# Patient Record
Sex: MALE | Race: WHITE | NOT HISPANIC OR LATINO | Employment: OTHER | URBAN - METROPOLITAN AREA
[De-identification: names, ages, dates, MRNs, and addresses within clinical notes are randomized per-mention and may not be internally consistent; named-entity substitution may affect disease eponyms.]

---

## 2017-01-06 ENCOUNTER — TELEPHONE (OUTPATIENT)
Dept: ORTHOPEDICS | Facility: CLINIC | Age: 57
End: 2017-01-06

## 2017-01-06 DIAGNOSIS — M19.032: Primary | ICD-10-CM

## 2017-01-06 DIAGNOSIS — M19.031: Primary | ICD-10-CM

## 2017-01-06 DIAGNOSIS — M79.645 PAIN OF LEFT THUMB: ICD-10-CM

## 2017-01-06 NOTE — TELEPHONE ENCOUNTER
Referral placed. Called patient to inform him that they will call to schedule.     Trish Elizabeth M.Ed., ATC

## 2017-01-06 NOTE — TELEPHONE ENCOUNTER
Reason for Call: Request for an order or referral:    Order or referral being requested: referral    Date needed: as soon as possible    Has the patient been seen by the PCP for this problem? Not Applicable    Additional comments: pt calling statin he would like to get a referral to see the hand specialist that Dr. Angel recommended     Phone number Patient can be reached at:  Home number on file [phone] (home)    Best Time:  Any     Can we leave a detailed message on this number?  YES    Call taken on 1/6/2017 at 12:28 PM by Caprice Connolly

## 2017-01-19 ENCOUNTER — MEDICAL CORRESPONDENCE (OUTPATIENT)
Dept: HEALTH INFORMATION MANAGEMENT | Facility: CLINIC | Age: 57
End: 2017-01-19

## 2017-01-25 ENCOUNTER — HOSPITAL ENCOUNTER (OUTPATIENT)
Dept: PHYSICAL THERAPY | Facility: CLINIC | Age: 57
Setting detail: THERAPIES SERIES
End: 2017-01-25
Attending: PHYSICIAN ASSISTANT
Payer: COMMERCIAL

## 2017-01-25 PROCEDURE — 97535 SELF CARE MNGMENT TRAINING: CPT | Mod: GP | Performed by: PHYSICAL THERAPIST

## 2017-01-25 PROCEDURE — 97112 NEUROMUSCULAR REEDUCATION: CPT | Mod: GP | Performed by: PHYSICAL THERAPIST

## 2017-01-25 PROCEDURE — 97110 THERAPEUTIC EXERCISES: CPT | Mod: GP | Performed by: PHYSICAL THERAPIST

## 2017-01-25 PROCEDURE — 40000718 ZZHC STATISTIC PT DEPARTMENT ORTHO VISIT: Performed by: PHYSICAL THERAPIST

## 2017-01-25 PROCEDURE — 97162 PT EVAL MOD COMPLEX 30 MIN: CPT | Mod: GP | Performed by: PHYSICAL THERAPIST

## 2017-01-25 PROCEDURE — 97140 MANUAL THERAPY 1/> REGIONS: CPT | Mod: GP | Performed by: PHYSICAL THERAPIST

## 2017-01-25 NOTE — PROGRESS NOTES
Koko Serrano     PHYSICAL THERAPY EVALUATION    01/25/17 1500   General Information   Type of Visit Initial OP Ortho PT Evaluation   Start of Care Date 01/25/17   Referring Physician JEANETTE Martinez   Patient/Family Goals Statement not sure, learn some exercises, prrevent LB from going out   Orders Evaluate and Treat   Date of Order 01/23/17   Insurance Type Blue [employer]   Insurance Comments/Visits Authorized blue plus MA   Medical Diagnosis back pain   Surgical/Medical history reviewed Yes   Body Part(s)   Body Part(s) Lumbar Spine/SI;Cervical Spine   Presentation and Etiology   Pertinent history of current problem (include personal factors and/or comorbidities that impact the POC) LBP, worked construction whole life.  Had other injuries along the way.  Dozer rolled on him, broke his neck.  About 3 years ago would try to step over things and he'd fall over.  Now in past year, cannot bend over to  something, back will go out.  Has happened 3 times this year.  L hip will go out, about 15 times this year.  Goes to chiro when back goes out.  Goes to [employer] to work out.  Does elliptical, , back exten, ab curls, push ex  . LB has gone out twice in past 2 months, cannot get up, needs help.  Will get pain down R LE when back goes out.  Also notes daily neck pain, bothers him more than LB on a daily basis, LB is worse when it goes out.  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   Onset date of current episode/exacerbation 11/25/16  (couple months)   Pain/symptoms exacerbated by A. Sitting;B. Walking;I. Bending   Prior Level of Function   Functional Level Prior Comment indep living, farms animals, goes to gym   Current Level of Function   Patient role/employment history A. Employed   Fall Risk Screen   Fall screen completed by PT   Per patient - Fall 2 or more times in past year? Yes   Per patient - Fall with injury in past year? No   Is patient a fall risk? No    Fall screen comments when hip or LB goes out   Lumbar Spine/SI Objective Findings   Posture decreased lumbar lordosis, mid T kyphosis, fwd head   Flexion ROM 75% pulls LB   Extension ROM 50% central LBP   Right Side Bending ROM 75%   Left Side Bending ROM 75%   Lumbar ROM Comment supine flex 100%, feels good   Pelvic Screen R ASIS inf/ L sup supine   Hip Screen hip ROM WNL except L IR 15*, FADIR +L   Hip Abduction Strength L 4, R 5   Hip Extension Strength L 5-, R 5   Hamstring Flexibility 60*BV   Hip Flexor Flexibility 15*B   Quadricep Flexibility WNL   SLR neg   Crossover SLR neg   Palpation tender B SI   Cervical Spine   Posture B winging scapula   Cervical Flexion ROM 80% pulls, EXT 80%, SB L 15%, R 25%, ROT R 40%, ROT L 50%   Shoulder ER (C5, C6) Strength 5-/5 B   Cervical Distraction Test felt good   Neck Flexor Endurance Test (normal 39 sec) 20   Segmental Mobility-Cervical OA ROT limited B, 20% R, 25% L, hypomob OA EXT/SB R   Palpation tight R suboccip   Planned Therapy Interventions   Planned Therapy Interventions stretching;strengthening;manual therapy;joint mobilization;neuromuscular re-education   Clinical Impression   Criteria for Skilled Therapeutic Interventions Met yes, treatment indicated   PT Diagnosis LBP, cervical pain, mm tightness   Functional limitations due to impairments bending, lifting heavy, standing too long   Clinical Presentation Evolving/Changing   Clinical Presentation Rationale 1-2 personal factors and/comorbidities that impact the POC., LBP, cervical, hip issues, prior injuries, broken neck, fractured leg, L knee needs replacement   Clinical Decision Making (Complexity) Moderate complexity   Therapy Frequency 1 time/week   Predicted Duration of Therapy Intervention (days/wks) 6wks   Risk & Benefits of therapy have been explained Yes   Patient, Family & other staff in agreement with plan of care Yes   Education Assessment   Preferred Learning Style Pictures/video;Demonstration    Barriers to Learning No barriers   ORTHO GOALS   PT Ortho Eval Goals 1;2;3   Ortho Goal 1   Goal Description pt will be able to demonstrate proper body mechanics while lifting 20# floor to waist for prevention of further LB issue   Target Date 03/08/17   Ortho Goal 2   Goal Description pt will be able to shovel snow as needed without LB going out in 6wk   Target Date 03/08/17   Ortho Goal 3   Goal Description pt will be indep inhome strengthening program for sefl management of sx in 6wk   Target Date 03/08/17   Total Evaluation Time   Total Evaluation Time 30   Kris Hoenk, PT #[id number]  [facility]

## 2017-01-31 ENCOUNTER — HOSPITAL ENCOUNTER (OUTPATIENT)
Dept: PHYSICAL THERAPY | Facility: CLINIC | Age: 57
Setting detail: THERAPIES SERIES
End: 2017-01-31
Attending: PHYSICIAN ASSISTANT
Payer: COMMERCIAL

## 2017-01-31 ENCOUNTER — HOSPITAL ENCOUNTER (OUTPATIENT)
Dept: MRI IMAGING | Facility: CLINIC | Age: 57
Discharge: HOME | End: 2017-01-31
Attending: PHYSICIAN ASSISTANT | Admitting: PHYSICIAN ASSISTANT
Payer: COMMERCIAL

## 2017-01-31 ENCOUNTER — HOSPITAL ENCOUNTER (OUTPATIENT)
Dept: MRI IMAGING | Facility: CLINIC | Age: 57
End: 2017-01-31
Attending: PHYSICIAN ASSISTANT
Payer: COMMERCIAL

## 2017-01-31 ENCOUNTER — HOSPITAL ENCOUNTER (OUTPATIENT)
Dept: GENERAL RADIOLOGY | Facility: CLINIC | Age: 57
End: 2017-01-31
Attending: PHYSICIAN ASSISTANT
Payer: COMMERCIAL

## 2017-01-31 DIAGNOSIS — M43.10 SPONDYLOLISTHESIS: ICD-10-CM

## 2017-01-31 DIAGNOSIS — M54.10 RADICULOPATHY, UNSPECIFIED SPINAL REGION: ICD-10-CM

## 2017-01-31 PROCEDURE — 40000718 ZZHC STATISTIC PT DEPARTMENT ORTHO VISIT: Performed by: PHYSICAL THERAPIST

## 2017-01-31 PROCEDURE — 72141 MRI NECK SPINE W/O DYE: CPT

## 2017-01-31 PROCEDURE — 70030 X-RAY EYE FOR FOREIGN BODY: CPT

## 2017-01-31 PROCEDURE — 97140 MANUAL THERAPY 1/> REGIONS: CPT | Mod: GP | Performed by: PHYSICAL THERAPIST

## 2017-01-31 PROCEDURE — 97110 THERAPEUTIC EXERCISES: CPT | Mod: GP | Performed by: PHYSICAL THERAPIST

## 2017-01-31 PROCEDURE — 72148 MRI LUMBAR SPINE W/O DYE: CPT

## 2017-03-17 NOTE — PROGRESS NOTES
PHYSICAL THERAPY DISCHARGE  01/31/17 1400   Signing Clinician's Name / Credentials   Signing clinician's name / credentials Kris Hoenk, PT   Session Number   Session Number 2 blue plus MA   Ortho Goal 1   Goal Description pt will be able to demonstrate proper body mechanics while lifting 20# floor to waist for prevention of further LB issue   Target Date 03/08/17   Ortho Goal 2   Goal Description pt will be able to shovel snow as needed without LB going out in 6wk   Target Date 03/08/17   Ortho Goal 3   Goal Description pt will be indep inhome strengthening program for sefl management of sx in 6wk   Target Date 03/08/17   Subjective Report   Subjective Report LB has been better, has not worked, not to gym, neck better too   Therapeutic Procedure/exercise   Minutes 18   Skilled Intervention stretches for hip, core stab   Patient Response ceus needed for TA   Treatment Detail hip flexor stretch in kneeling, upper trap, levataor stretches, TA set review, needed cues again to draw in/not out, TA with march x10   Manual Therapy   Minutes 25   Skilled Intervention MET, manual techniques   Patient Response tender R suboccip, less tight   Treatment Detail MET pube clearing, Lpost ilium, STM suboccip, uper cerv R>L, suboccip release/trigger points   Plan   Plan for next session 1x/wk up to 6wk   Comments   Comments 1-2more likely  Patient has not been seen in over 30 days and will be discharged at this time.  Final status as above     Total Session Time   Total Session Time 43   Kris Hoenk, PT #[id number]  [facility]

## 2017-05-17 ENCOUNTER — OFFICE VISIT (OUTPATIENT)
Dept: OTOLARYNGOLOGY | Facility: CLINIC | Age: 57
End: 2017-05-17
Payer: COMMERCIAL

## 2017-05-17 VITALS — HEIGHT: 70 IN | BODY MASS INDEX: 25.4 KG/M2 | RESPIRATION RATE: 16 BRPM | WEIGHT: 177.4 LBS

## 2017-05-17 DIAGNOSIS — J01.00 ACUTE NON-RECURRENT MAXILLARY SINUSITIS: Primary | ICD-10-CM

## 2017-05-17 PROCEDURE — 99203 OFFICE O/P NEW LOW 30 MIN: CPT | Performed by: OTOLARYNGOLOGY

## 2017-05-17 ASSESSMENT — PAIN SCALES - GENERAL: PAINLEVEL: NO PAIN (0)

## 2017-05-17 NOTE — NURSING NOTE
"Chief Complaint   Patient presents with     Lesion     inside nose, bleeding       Initial Resp 16  Ht 1.778 m (5' 10\")  Wt 80.5 kg (177 lb 6.4 oz)  BMI 25.45 kg/m2 Estimated body mass index is 25.45 kg/(m^2) as calculated from the following:    Height as of this encounter: 1.778 m (5' 10\").    Weight as of this encounter: 80.5 kg (177 lb 6.4 oz).  Medication Reconciliation: complete     Katlyn Bran MA    "

## 2017-05-17 NOTE — PROGRESS NOTES
Chief Complaint - sinusitis    History of Present Illness - Koko Serrano is a 57 year old male who presents for evaluation of possible sinusitis. The patient describes symptoms of fever, right face pressure, sore throat. He feels in his nose he has white spots and bleeding from right nose. This started two weeks ago after returning from [place]. He swam in ocean. Treatments have included sudafed, nyquil, ibuprofen. He is getting better. No prior history of sinusitis or sinus surgery.    Past Medical History -   Patient Active Problem List   Diagnosis     Hematuria     HYPERLIPIDEMIA LDL GOAL <130       Current Medications -   Current Outpatient Prescriptions:      Ascorbic Acid (VITAMIN C) 500 MG CHEW, Take 1 chew tab by mouth daily as needed, Disp: , Rfl:      methylPREDNISolone (MEDROL DOSEPAK) 4 MG tablet, Follow package instructions, Disp: 21 tablet, Rfl: 0     Cholecalciferol (VITAMIN D) 2000 UNITS CAPS, Take 1 tablet by mouth daily., Disp: , Rfl:      FISH OIL 1000 MG PO CAPS, 1 CAPSULES DAILY , Disp: , Rfl:      GLUCOSAMINE CHONDRO COMPLEX OR, 1 tablet by mouth daily, Disp: , Rfl:     Allergies -   Allergies   Allergen Reactions     Nka [No Known Allergies]        Social History -   Social History     Social History     Marital status:      Spouse name: N/A     Number of children: N/A     Years of education: N/A     Social History Main Topics     Smoking status: Never Smoker     Smokeless tobacco: Never Used     Alcohol use Yes      Comment: rare     Drug use: None     Sexual activity: Not Asked     Other Topics Concern     None     Social History Narrative       Family History -   Family History   Problem Relation Age of Onset     Hypertension Mother      Circulatory Maternal Grandmother      aneurysm-AAA     Circulatory Paternal Grandmother      Blood clot     Hypertension Brother      Alcohol/Drug Brother      Alcohol/Drug Maternal Grandfather        Review of Systems - As per HPI and PMHx,  "otherwise 7 system review of the head and neck negative.    Physical Exam  Resp 16  Ht 1.778 m (5' 10\")  Wt 80.5 kg (177 lb 6.4 oz)  BMI 25.45 kg/m2  General - The patient is nontoxic, in no distress. Alert and oriented to person and place, answers questions and cooperates with examination appropriately.   Neurologic - CN II-XII are intact. No focal neurologic deficits.   Voice and Breathing - The patient was breathing comfortably without the use of accessory muscles. There was no wheezing, stridor, or stertor.  The patients voice was clear and strong.  Eyes - Extraocular movements intact.  Sclera were not icteric or injected, conjunctiva were pink and moist.  Mouth - Examination of the oral cavity showed pink, healthy oral mucosa. No lesions or ulcerations noted.  The tongue was mobile and midline.  Throat - The walls of the oropharynx were smooth, symmetric, and had no lesions or ulcerations.  No postnasal drainage.  The uvula was midline on elevation.  Nose - External contour is symmetric, no gross deflection or scars.  He has some right sided white mucous anteriorly on the septum. Also some bloody and raw spots. Some yellow crust on the head of the right middle turbinate. No polyps.  The septum was midline and non-obstructive.  Neck - Palpation of the occipital, submental, submandibular, internal jugular chain, and supraclavicular nodes did not demonstrate any abnormal lymph nodes or masses. No parotid masses. Palpation of the thyroid was soft and smooth, with no nodules or goiter appreciated.  The trachea was mobile and midline.        A/P - Koko Serrano is a 57 year old male with likely resolving sinusitis. He is much better. He has some mucous still on the right side. I recommend treatment with nasal saline irrigations. If in 1-2 weeks things aren't fully resolved he should call for an antibiotic.     Low Buckley MD  Otolaryngology  [facility] [place]      "

## 2017-05-17 NOTE — PATIENT INSTRUCTIONS
General Scheduling Information  To schedule your CT/MRI scan, please contact [place] [facility] at [phone]   [address] [place] [address]    To schedule your Surgery, please contact our Specialty Schedulers at [phone]    ENT Clinic Locations Clinic Hours Telephone Number     [facility]  [address] [place] [address]   Tuesday:       8:00am -- 4:00pm    Wednesday:  8:00am - 4:00pm   To schedule an appointment with   Dr. Buckley,   please contact our   Specialty Scheduling Department at:     [phone]       [facility]  [address] [place] [address]   Friday:          8:00am - 4:00pm         Urgent Care Locations Clinic Hours Telephone Numbers     [facility]  [address] [place] [address]     Monday-Friday:     11:00pm - 9:00pm    Saturday-Sunday:  9:00am - 5:00pm   [phone]     [facility]  [address] [place] [address]     Monday-Friday:      5:00pm - 9:00pm     Saturday-Sunday:  9:00am - 5:00pm   [phone]

## 2017-05-17 NOTE — MR AVS SNAPSHOT
After Visit Summary   5/17/2017    Koko Serrano    MRN: [id number]           Patient Information     Date Of Birth          1960        Visit Information        Provider Department      5/17/2017 10:00 AM Low Buckley MD [facility]        Today's Diagnoses     Acute non-recurrent maxillary sinusitis    -  1      Care Instructions    General Scheduling Information  To schedule your CT/MRI scan, please contact [place] [facility] at [phone]   [address] [place] [address]    To schedule your Surgery, please contact our Specialty Schedulers at [phone]    ENT Clinic Locations Clinic Hours Telephone Number     [facility]  [address]  [place] [address]   Tuesday:       8:00am -- 4:00pm    Wednesday:  8:00am - 4:00pm   To schedule an appointment with   Dr. Buckley,   please contact our   Specialty Scheduling Department at:     [phone]       [facility]  [address] [place] [address]   Friday:          8:00am - 4:00pm         Urgent Care Locations Clinic Hours Telephone Numbers     [facility]  [address] [place] [address]     Monday-Friday:     11:00pm - 9:00pm    Saturday-Sunday:  9:00am - 5:00pm   [phone]     [facility]  [address] [address]     Monday-Friday:      5:00pm - 9:00pm     Saturday-Sunday:  9:00am - 5:00pm   [phone]             Follow-ups after your visit        Who to contact     If you have questions or need follow up information about today's clinic visit or your schedule please contact [facility] directly at [phone].  Normal or non-critical lab and imaging results will be communicated to you by MyChart, letter or phone within 4 business days after the clinic has received the results. If you do not hear from us within 7 days, please contact the clinic through MyChart or phone. If you have a critical or abnormal lab result, we will  "notify you by phone as soon as possible.  Submit refill requests through [email or web address] or call your pharmacy and they will forward the refill request to us. Please allow 3 business days for your refill to be completed.          Additional Information About Your Visit        [employer] Information     [email or web address] lets you send messages to your doctor, view your test results, renew your prescriptions, schedule appointments and more. To sign up, go to [email or web address] . Click on \"Log in\" on the left side of the screen, which will take you to the Welcome page. Then click on \"Sign up Now\" on the right side of the page.     You will be asked to enter the access code listed below, as well as some personal information. Please follow the directions to create your username and password.     Your access code is: [email or web address]  Expires: 8/15/2017 12:55 PM     Your access code will  in 90 days. If you need help or a new code, please call your [facility] clinic or [phone].        Care EveryWhere ID     This is your Care EveryWhere ID. This could be used by other organizations to access your [facility] medical records  [id number]        Your Vitals Were     Respirations Height BMI (Body Mass Index)             16 1.778 m (5' 10\") 25.45 kg/m2          Blood Pressure from Last 3 Encounters:   16 122/79   10/11/16 120/75   10/04/16 116/74    Weight from Last 3 Encounters:   17 80.5 kg (177 lb 6.4 oz)   10/11/16 81.6 kg (180 lb)   10/04/16 81.6 kg (180 lb)              Today, you had the following     No orders found for display       Primary Care Provider    Physician No Ref-Primary       No address on file        Thank you!     Thank you for choosing [facility] [place]  for your care. Our goal is always to provide you with excellent care. Hearing back from our patients is one way we can continue to improve our services. Please take a few minutes to complete the written survey that you may receive in the mail " after your visit with us. Thank you!             Your Updated Medication List - Protect others around you: Learn how to safely use, store and throw away your medicines at www.disposemymeds.org.          This list is accurate as of: 5/17/17 12:55 PM.  Always use your most recent med list.                   Brand Name Dispense Instructions for use    fish oil-omega-3 fatty acids 1000 MG capsule      1 CAPSULES DAILY       GLUCOSAMINE CHONDRO COMPLEX OR      1 tablet by mouth daily       methylPREDNISolone 4 MG tablet    MEDROL DOSEPAK    21 tablet    Follow package instructions       vitamin C 500 MG Chew      Take 1 chew tab by mouth daily as needed       vitamin D 2000 UNITS Caps      Take 1 tablet by mouth daily.

## 2018-05-07 ENCOUNTER — OFFICE VISIT (OUTPATIENT)
Dept: FAMILY MEDICINE | Facility: CLINIC | Age: 58
End: 2018-05-07
Payer: COMMERCIAL

## 2018-05-07 ENCOUNTER — RADIANT APPOINTMENT (OUTPATIENT)
Dept: GENERAL RADIOLOGY | Facility: CLINIC | Age: 58
End: 2018-05-07
Attending: NURSE PRACTITIONER
Payer: COMMERCIAL

## 2018-05-07 VITALS
OXYGEN SATURATION: 94 % | WEIGHT: 177.4 LBS | DIASTOLIC BLOOD PRESSURE: 76 MMHG | SYSTOLIC BLOOD PRESSURE: 116 MMHG | BODY MASS INDEX: 25.45 KG/M2 | HEART RATE: 74 BPM | TEMPERATURE: 96.7 F | RESPIRATION RATE: 20 BRPM

## 2018-05-07 DIAGNOSIS — J20.9 ACUTE BRONCHITIS, UNSPECIFIED ORGANISM: Primary | ICD-10-CM

## 2018-05-07 DIAGNOSIS — R05.9 COUGH: ICD-10-CM

## 2018-05-07 PROCEDURE — 71046 X-RAY EXAM CHEST 2 VIEWS: CPT | Mod: FY

## 2018-05-07 PROCEDURE — 99214 OFFICE O/P EST MOD 30 MIN: CPT | Performed by: NURSE PRACTITIONER

## 2018-05-07 RX ORDER — PREDNISONE 20 MG/1
TABLET ORAL
Qty: 10 TABLET | Refills: 0 | Status: SHIPPED | OUTPATIENT
Start: 2018-05-07 | End: 2020-05-27

## 2018-05-07 RX ORDER — MAGNESIUM 200 MG
1000 TABLET ORAL DAILY
COMMUNITY

## 2018-05-07 RX ORDER — ALBUTEROL SULFATE 90 UG/1
2 AEROSOL, METERED RESPIRATORY (INHALATION) EVERY 6 HOURS PRN
Qty: 1 INHALER | Refills: 0 | Status: SHIPPED | OUTPATIENT
Start: 2018-05-07 | End: 2020-05-27

## 2018-05-07 RX ORDER — BENZONATATE 100 MG/1
100 CAPSULE ORAL 3 TIMES DAILY PRN
Qty: 42 CAPSULE | Refills: 0 | Status: SHIPPED | OUTPATIENT
Start: 2018-05-07 | End: 2020-05-27

## 2018-05-07 NOTE — NURSING NOTE
"Chief Complaint   Patient presents with     Cough       Initial /76 (BP Location: Right arm, Cuff Size: Adult Regular)  Pulse 74  Temp 96.7  F (35.9  C) (Tympanic)  Resp 20  Wt 177 lb 6.4 oz (80.5 kg)  SpO2 94%  BMI 25.45 kg/m2 Estimated body mass index is 25.45 kg/(m^2) as calculated from the following:    Height as of 5/17/17: 5' 10\" (1.778 m).    Weight as of this encounter: 177 lb 6.4 oz (80.5 kg).      Health Maintenance that is potentially due pending provider review:  NONE    n/a    Is there anyone who you would like to be able to receive your results? Not Applicable  If yes have patient fill out JOANNE  Veto [place] M.A.      "

## 2018-05-07 NOTE — PATIENT INSTRUCTIONS
Use Medication as directed      Hydrate with fluids, rest, cool humidifier.  May use acetaminophen, ibuprofen prn.    For your Cough   The Buckwheat Honey Dose: Given   hour Prior to Bedtime  For children age under 1 year -Do not use due to botulism risk     2-5 years -  tsp (2.5 mL)    6-11 years -1 tsp (5 mL)    12-18 years -2 tsp (10 mL)     Guaifenesin     Adult dose -Not to exceed 2.4 g (2400mg) per day    Child age 6-12 years -100 mg every 4 hr, not to exceed 1.2 g (1200mg) per day     Pediatric, 2-6 years -50 mg every 4 hr as needed, not to exceed 600 mg per day    Cough medications is not recommended in children under 2 years.  With use of cough medications have combination medications be aware of products in the cough medication you are using to avoid overdose    Follow up with PCP in 1 weeks.    Go to Emergency Room if sx worsen or change, Shortness of breath, chest pain, persistent fevers, or painful breathing occur.     Patient voiced understanding of instructions given.          Viral Bronchitis (Adult)    You have a viral bronchitis. Bronchitis is inflammation and swelling of the lining of the lungs. This is often caused by an infection. Symptoms include a dry, hacking cough that is worse at night. The cough may bring up yellow-green mucus. You may also feel short of breath or wheeze. Other symptoms may include tiredness, chest discomfort, and chills.  Bronchitis that is caused by a virus is not treated with antibiotics. Instead, medicines may be given to help relieve symptoms. Symptoms can last up to 2 weeks, although the cough may last much longer.  This illness is contagious during the first few days and is spread through the air by coughing and sneezing, or by direct contact (touching the sick person and then touching your own eyes, nose, or mouth).  Most viral illnesses resolve within 10 to 14 days with rest and simple home remedies, although they may sometimes last for several weeks.  Home  care    If symptoms are severe, rest at home for the first 2 to 3 days. When you go back to your usual activities, don't let yourself get too tired.    Do not smoke. Also avoid being exposed to secondhand smoke.    You may use over-the-counter medicine to control fever or pain, unless another pain medicine was prescribed. If you have chronic liver or kidney disease or have ever had a stomach ulcer or gastrointestinal bleeding, talk with your healthcare provider before using these medicines. Also talk to your provider if you are taking medicine to prevent blood clots. Aspirin should never be given to anyone younger than 18 years of age who is ill with a viral infection or fever. It may cause severe liver or brain damage.    Your appetite may be poor, so a light diet is fine. Avoid dehydration by drinking 6 to 8 glasses of fluids per day (such as water, soft drinks, sports drinks, juices, tea, or soup). Extra fluids will help loosen secretions in the nose and lungs.    Over-the-counter cough, cold, and sore-throat medicines will not shorten the length of the illness, but they may help to reduce symptoms. Don't use decongestants if you have high blood pressure.  Follow-up care  Follow up with your healthcare provider, or as advised. If you had an X-ray or ECG (electrocardiogram), a specialist will review it. You will be notified of any new findings that may affect your care.  If you are age 65 or older, or if you have a chronic lung disease or condition that affects your immune system, or you smoke, ask your healthcare provider about getting a pneumococcal vaccine and a yearly flu shot (influenza vaccine).  When to seek medical advice  Call your healthcare provider right away if any of these occur:    Fever of 100.4 F (38 C) or higher, or as directed by your healthcare provider    Coughing up increased amounts of colored sputum    Weakness, drowsiness, headache, facial pain, ear pain, or a stiff neck  Call 911  Call  911 if any of these occur:    Coughing up blood    Worsening weakness, drowsiness, headache, or stiff neck    Trouble breathing, wheezing, or pain with breathing  Date Last Reviewed: 9/13/2015 2000-2017 [employer]. [address] [place] [address]. All rights reserved. This information is not intended as a substitute for professional medical care. Always follow your healthcare professional's instructions.

## 2018-05-07 NOTE — PROGRESS NOTES
SUBJECTIVE:   Koko Serrano is a 58 year old male who presents to clinic today for the following health issues:    ENT Symptoms             Symptoms: cc Present Absent Comment   Fever/Chills  x     Fatigue  x     Muscle Aches  x     Eye Irritation       Sneezing       Nasal Owen/Drg  x     Sinus Pressure/Pain       Loss of smell       Dental pain       Sore Throat       Swollen Glands       Ear Pain/Fullness       Cough  x  Sometimes productive    Wheeze       Chest Pain  x     Shortness of breath  x     Rash       Other         Symptom duration:  Last Wednesday    Symptom severity:  worse    Treatments tried:  Nyquil, Therflu, Ibuprofen    Contacts:  Co-workers at work had pneumonia         Problem list and histories reviewed & adjusted, as indicated.  Additional history: as documented    Patient Active Problem List   Diagnosis     Hematuria     HYPERLIPIDEMIA LDL GOAL <130     Past Surgical History:   Procedure Laterality Date     C APPENDECTOMY       COLONOSCOPY  2003     COLONOSCOPY  5/1/2014    Procedure: COLONOSCOPY;  Surgeon: Wing Hyatt MD;  Location: [facility] REMOVE TONSILS/ADENOIDS,12+ Y/O      T & A 10+y.o.     SURGICAL HISTORY OF -       Hernia Repair x2     SURGICAL HISTORY OF -       Thyroglassal Cyst Surgery     SURGICAL HISTORY OF -       Cystourethroscopy, right ureteral reimplantation on 7-       Social History   Substance Use Topics     Smoking status: Never Smoker     Smokeless tobacco: Never Used     Alcohol use Yes      Comment: rare     Family History   Problem Relation Age of Onset     Hypertension Mother      Circulatory Maternal Grandmother      aneurysm-AAA     Alcohol/Drug Maternal Grandfather      Circulatory Paternal Grandmother      Blood clot     Hypertension Brother      Alcohol/Drug Brother          Current Outpatient Prescriptions   Medication Sig Dispense Refill     albuterol (PROAIR HFA/PROVENTIL HFA/VENTOLIN HFA) 108 (90 Base) MCG/ACT Inhaler Inhale 2 puffs  into the lungs every 6 hours as needed for shortness of breath / dyspnea or wheezing 1 Inhaler 0     Ascorbic Acid (VITAMIN C) 500 MG CHEW Take 1 chew tab by mouth daily as needed       benzonatate (TESSALON PERLES) 100 MG capsule Take 1 capsule (100 mg) by mouth 3 times daily as needed for cough 42 capsule 0     Cholecalciferol (VITAMIN D) 2000 UNITS CAPS Take 1 tablet by mouth daily.       cyanocobalamin 1000 MCG SUBL sublingual tablet Place 1,000 mcg under the tongue daily       FISH OIL 1000 MG PO CAPS 1 CAPSULES DAILY        GLUCOSAMINE CHONDRO COMPLEX OR 1 tablet by mouth daily       predniSONE (DELTASONE) 20 MG tablet Take 2 tablets daily for 5 days 10 tablet 0     Zinc Sulfate (ZINC 15 PO)        Allergies   Allergen Reactions     Nka [No Known Allergies]        Reviewed and updated as needed this visit by clinical staff       Reviewed and updated as needed this visit by Provider         ROS:  Constitutional, HEENT, cardiovascular, pulmonary, gi and gu systems are negative, except as otherwise noted.    OBJECTIVE:     /76 (BP Location: Right arm, Cuff Size: Adult Regular)  Pulse 74  Temp 96.7  F (35.9  C) (Tympanic)  Resp 20  Wt 177 lb 6.4 oz (80.5 kg)  SpO2 94%  BMI 25.45 kg/m2  Body mass index is 25.45 kg/(m^2).   GENERAL: healthy, alert and no distress  EYES: Eyes grossly normal to inspection, PERRL and conjunctivae and sclerae normal  HENT: ear canals and TM's normal, nose and mouth without ulcers or lesions  NECK: no adenopathy, no asymmetry, masses, or scars and thyroid normal to palpation  RESP: lungs clear to auscultation - no rales, rhonchi or wheezes  CV: regular rate and rhythm, normal S1 S2, no S3 or S4, no murmur, click or rub, no peripheral edema and peripheral pulses strong  MS: no gross musculoskeletal defects noted, no edema  SKIN: no suspicious lesions or rashes  NEURO: Normal strength and tone, mentation intact and speech normal  PSYCH: mentation appears normal, affect  normal/bright    XR CHEST 2 VW 5/7/2018 12:10 PM     HISTORY: Cough.     COMPARISON: 12/20/2016.         IMPRESSION: 2 views of the chest show no acute or active  cardiopulmonary disease.       ASSESSMENT:       ICD-10-CM    1. Acute bronchitis, unspecified organism J20.9 predniSONE (DELTASONE) 20 MG tablet     albuterol (PROAIR HFA/PROVENTIL HFA/VENTOLIN HFA) 108 (90 Base) MCG/ACT Inhaler     benzonatate (TESSALON PERLES) 100 MG capsule   2. Cough R05 XR Chest 2 Views         PLAN:   Chest x-ray obtained concern for pneumonia.  X-ray was negative.    Patient Instructions   Use Medication as directed      Hydrate with fluids, rest, cool humidifier.  May use acetaminophen, ibuprofen prn.    For your Cough   The Buckwheat Honey Dose: Given   hour Prior to Bedtime  For children age under 1 year -Do not use due to botulism risk     2-5 years -  tsp (2.5 mL)    6-11 years -1 tsp (5 mL)    12-18 years -2 tsp (10 mL)     Guaifenesin     Adult dose -Not to exceed 2.4 g (2400mg) per day    Child age 6-12 years -100 mg every 4 hr, not to exceed 1.2 g (1200mg) per day     Pediatric, 2-6 years -50 mg every 4 hr as needed, not to exceed 600 mg per day    Cough medications is not recommended in children under 2 years.  With use of cough medications have combination medications be aware of products in the cough medication you are using to avoid overdose    Follow up with PCP in 1 weeks.    Go to Emergency Room if sx worsen or change, Shortness of breath, chest pain, persistent fevers, or painful breathing occur.     Patient voiced understanding of instructions given.          Viral Bronchitis (Adult)    You have a viral bronchitis. Bronchitis is inflammation and swelling of the lining of the lungs. This is often caused by an infection. Symptoms include a dry, hacking cough that is worse at night. The cough may bring up yellow-green mucus. You may also feel short of breath or wheeze. Other symptoms may include tiredness, chest  discomfort, and chills.  Bronchitis that is caused by a virus is not treated with antibiotics. Instead, medicines may be given to help relieve symptoms. Symptoms can last up to 2 weeks, although the cough may last much longer.  This illness is contagious during the first few days and is spread through the air by coughing and sneezing, or by direct contact (touching the sick person and then touching your own eyes, nose, or mouth).  Most viral illnesses resolve within 10 to 14 days with rest and simple home remedies, although they may sometimes last for several weeks.  Home care    If symptoms are severe, rest at home for the first 2 to 3 days. When you go back to your usual activities, don't let yourself get too tired.    Do not smoke. Also avoid being exposed to secondhand smoke.    You may use over-the-counter medicine to control fever or pain, unless another pain medicine was prescribed. If you have chronic liver or kidney disease or have ever had a stomach ulcer or gastrointestinal bleeding, talk with your healthcare provider before using these medicines. Also talk to your provider if you are taking medicine to prevent blood clots. Aspirin should never be given to anyone younger than 18 years of age who is ill with a viral infection or fever. It may cause severe liver or brain damage.    Your appetite may be poor, so a light diet is fine. Avoid dehydration by drinking 6 to 8 glasses of fluids per day (such as water, soft drinks, sports drinks, juices, tea, or soup). Extra fluids will help loosen secretions in the nose and lungs.    Over-the-counter cough, cold, and sore-throat medicines will not shorten the length of the illness, but they may help to reduce symptoms. Don't use decongestants if you have high blood pressure.  Follow-up care  Follow up with your healthcare provider, or as advised. If you had an X-ray or ECG (electrocardiogram), a specialist will review it. You will be notified of any new findings  that may affect your care.  If you are age 65 or older, or if you have a chronic lung disease or condition that affects your immune system, or you smoke, ask your healthcare provider about getting a pneumococcal vaccine and a yearly flu shot (influenza vaccine).  When to seek medical advice  Call your healthcare provider right away if any of these occur:    Fever of 100.4 F (38 C) or higher, or as directed by your healthcare provider    Coughing up increased amounts of colored sputum    Weakness, drowsiness, headache, facial pain, ear pain, or a stiff neck  Call 911  Call 911 if any of these occur:    Coughing up blood    Worsening weakness, drowsiness, headache, or stiff neck    Trouble breathing, wheezing, or pain with breathing  Date Last Reviewed: 9/13/2015 2000-2017 [employer]. [address] [place] [address], [place] [address]. All rights reserved. This information is not intended as a substitute for professional medical care. Always follow your healthcare professional's instructions.            CHRISTIAN Christiansen [facility]

## 2018-05-07 NOTE — MR AVS SNAPSHOT
After Visit Summary   5/7/2018    Koko Serrano    MRN: [id number]           Patient Information     Date Of Birth          1960        Visit Information        Provider Department      5/7/2018 11:40 AM Juliet Sotelo APRN [facility]        Today's Diagnoses     Cough    -  1    Acute bronchitis, unspecified organism          Care Instructions    Use Medication as directed      Hydrate with fluids, rest, cool humidifier.  May use acetaminophen, ibuprofen prn.    For your Cough   The Buckwheat Honey Dose: Given   hour Prior to Bedtime  For children age under 1 year -Do not use due to botulism risk     2-5 years -  tsp (2.5 mL)    6-11 years -1 tsp (5 mL)    12-18 years -2 tsp (10 mL)     Guaifenesin     Adult dose -Not to exceed 2.4 g (2400mg) per day    Child age 6-12 years -100 mg every 4 hr, not to exceed 1.2 g (1200mg) per day     Pediatric, 2-6 years -50 mg every 4 hr as needed, not to exceed 600 mg per day    Cough medications is not recommended in children under 2 years.  With use of cough medications have combination medications be aware of products in the cough medication you are using to avoid overdose    Follow up with PCP in 1 weeks.    Go to Emergency Room if sx worsen or change, Shortness of breath, chest pain, persistent fevers, or painful breathing occur.     Patient voiced understanding of instructions given.          Viral Bronchitis (Adult)    You have a viral bronchitis. Bronchitis is inflammation and swelling of the lining of the lungs. This is often caused by an infection. Symptoms include a dry, hacking cough that is worse at night. The cough may bring up yellow-green mucus. You may also feel short of breath or wheeze. Other symptoms may include tiredness, chest discomfort, and chills.  Bronchitis that is caused by a virus is not treated with antibiotics. Instead, medicines may be given to help relieve symptoms. Symptoms can last up to 2 weeks,  although the cough may last much longer.  This illness is contagious during the first few days and is spread through the air by coughing and sneezing, or by direct contact (touching the sick person and then touching your own eyes, nose, or mouth).  Most viral illnesses resolve within 10 to 14 days with rest and simple home remedies, although they may sometimes last for several weeks.  Home care    If symptoms are severe, rest at home for the first 2 to 3 days. When you go back to your usual activities, don't let yourself get too tired.    Do not smoke. Also avoid being exposed to secondhand smoke.    You may use over-the-counter medicine to control fever or pain, unless another pain medicine was prescribed. If you have chronic liver or kidney disease or have ever had a stomach ulcer or gastrointestinal bleeding, talk with your healthcare provider before using these medicines. Also talk to your provider if you are taking medicine to prevent blood clots. Aspirin should never be given to anyone younger than 18 years of age who is ill with a viral infection or fever. It may cause severe liver or brain damage.    Your appetite may be poor, so a light diet is fine. Avoid dehydration by drinking 6 to 8 glasses of fluids per day (such as water, soft drinks, sports drinks, juices, tea, or soup). Extra fluids will help loosen secretions in the nose and lungs.    Over-the-counter cough, cold, and sore-throat medicines will not shorten the length of the illness, but they may help to reduce symptoms. Don't use decongestants if you have high blood pressure.  Follow-up care  Follow up with your healthcare provider, or as advised. If you had an X-ray or ECG (electrocardiogram), a specialist will review it. You will be notified of any new findings that may affect your care.  If you are age 65 or older, or if you have a chronic lung disease or condition that affects your immune system, or you smoke, ask your healthcare provider  "about getting a pneumococcal vaccine and a yearly flu shot (influenza vaccine).  When to seek medical advice  Call your healthcare provider right away if any of these occur:    Fever of 100.4 F (38 C) or higher, or as directed by your healthcare provider    Coughing up increased amounts of colored sputum    Weakness, drowsiness, headache, facial pain, ear pain, or a stiff neck  Call 911  Call 911 if any of these occur:    Coughing up blood    Worsening weakness, drowsiness, headache, or stiff neck    Trouble breathing, wheezing, or pain with breathing  Date Last Reviewed: 9/13/2015 2000-2017 [employer]. [address] [place] [address]. All rights reserved. This information is not intended as a substitute for professional medical care. Always follow your healthcare professional's instructions.                Follow-ups after your visit        Who to contact     If you have questions or need follow up information about today's clinic visit or your schedule please contact [facility] directly at [phone].  Normal or non-critical lab and imaging results will be communicated to you by Hulafroghart, letter or phone within 4 business days after the clinic has received the results. If you do not hear from us within 7 days, please contact the clinic through [email or web address] or phone. If you have a critical or abnormal lab result, we will notify you by phone as soon as possible.  Submit refill requests through [employer] or call your pharmacy and they will forward the refill request to us. Please allow 3 business days for your refill to be completed.          Additional Information About Your Visit        [employer] Information     [employer] lets you send messages to your doctor, view your test results, renew your prescriptions, schedule appointments and more. To sign up, go to [email or web address] . Click on \"Log in\" on the left side of the screen, which will take you to the Welcome page. Then " "click on \"Sign up Now\" on the right side of the page.     You will be asked to enter the access code listed below, as well as some personal information. Please follow the directions to create your username and password.     Your access code is: [email or web address]  Expires: 2018 12:34 PM     Your access code will  in 90 days. If you need help or a new code, please call your [facility] clinic or [phone].        Care EveryWhere ID     This is your Care EveryWhere ID. This could be used by other organizations to access your [facility] medical records  [id number]        Your Vitals Were     Pulse Temperature Respirations Pulse Oximetry BMI (Body Mass Index)       74 96.7  F (35.9  C) (Tympanic) 20 94% 25.45 kg/m2        Blood Pressure from Last 3 Encounters:   18 116/76   16 122/79   10/11/16 120/75    Weight from Last 3 Encounters:   18 177 lb 6.4 oz (80.5 kg)   17 177 lb 6.4 oz (80.5 kg)   10/11/16 180 lb (81.6 kg)                 Today's Medication Changes          These changes are accurate as of 18 12:34 PM.  If you have any questions, ask your nurse or doctor.               Start taking these medicines.        Dose/Directions    albuterol 108 (90 Base) MCG/ACT Inhaler   Commonly known as:  PROAIR HFA/PROVENTIL HFA/VENTOLIN HFA   Used for:  Acute bronchitis, unspecified organism   Started by:  Juliet Sotelo APRN CNP        Dose:  2 puff   Inhale 2 puffs into the lungs every 6 hours as needed for shortness of breath / dyspnea or wheezing   Quantity:  1 Inhaler   Refills:  0       benzonatate 100 MG capsule   Commonly known as:  TESSALON PERLES   Used for:  Acute bronchitis, unspecified organism   Started by:  Juliet Sotelo APRN CNP        Dose:  100 mg   Take 1 capsule (100 mg) by mouth 3 times daily as needed for cough   Quantity:  42 capsule   Refills:  0       predniSONE 20 MG tablet   Commonly known as:  DELTASONE   Used for:  Acute bronchitis, unspecified organism "   Started by:  Juliet Sotelo APRN CNP        Take 2 tablets daily for 5 days   Quantity:  10 tablet   Refills:  0            Where to get your medicines      [place] [facility] [place] [id number] [place] [address]     Phone:  [phone]     albuterol 108 (90 Base) MCG/ACT Inhaler    benzonatate 100 MG capsule    predniSONE 20 MG tablet                Primary Care Provider Fax #    Physician No Ref-Primary [phone]       No address on file        Equal Access to Services     [facility]: Hadii fátima ku hadasho Soomaali, waaxda luqadaha, qaybta kaalmada adeegyada, drew agustin . So [facility] [phone].    ATENCIÓN: Si habla español, tiene a soliz disposición servicios gratuitos de asistencia lingüística. Jyotsnaame al [phone].    We comply with applicable federal civil rights laws and [place] laws. We do not discriminate on the basis of race, color, national origin, age, disability, sex, sexual orientation, or gender identity.            Thank you!     Thank you for choosing [facility]  for your care. Our goal is always to provide you with excellent care. Hearing back from our patients is one way we can continue to improve our services. Please take a few minutes to complete the written survey that you may receive in the mail after your visit with us. Thank you!             Your Updated Medication List - Protect others around you: Learn how to safely use, store and throw away your medicines at www.disposemymeds.org.          This list is accurate as of 5/7/18 12:34 PM.  Always use your most recent med list.                   Brand Name Dispense Instructions for use Diagnosis    albuterol 108 (90 Base) MCG/ACT Inhaler    PROAIR HFA/PROVENTIL HFA/VENTOLIN HFA    1 Inhaler    Inhale 2 puffs into the lungs every 6 hours as needed for shortness of breath / dyspnea or wheezing    Acute  bronchitis, unspecified organism       benzonatate 100 MG capsule    TESSALON PERLES    42 capsule    Take 1 capsule (100 mg) by mouth 3 times daily as needed for cough    Acute bronchitis, unspecified organism       cyanocobalamin 1000 MCG Subl sublingual tablet      Place 1,000 mcg under the tongue daily        fish oil-omega-3 fatty acids 1000 MG capsule      1 CAPSULES DAILY        GLUCOSAMINE CHONDRO COMPLEX OR      1 tablet by mouth daily        predniSONE 20 MG tablet    DELTASONE    10 tablet    Take 2 tablets daily for 5 days    Acute bronchitis, unspecified organism       vitamin C 500 MG Chew      Take 1 chew tab by mouth daily as needed        vitamin D 2000 units Caps      Take 1 tablet by mouth daily.        ZINC 15 PO

## 2019-02-23 ENCOUNTER — NURSE TRIAGE (OUTPATIENT)
Dept: NURSING | Facility: CLINIC | Age: 59
End: 2019-02-23

## 2019-02-23 ENCOUNTER — OFFICE VISIT (OUTPATIENT)
Dept: URGENT CARE | Facility: URGENT CARE | Age: 59
End: 2019-02-23

## 2019-02-23 VITALS
WEIGHT: 165 LBS | RESPIRATION RATE: 16 BRPM | DIASTOLIC BLOOD PRESSURE: 75 MMHG | HEART RATE: 59 BPM | BODY MASS INDEX: 23.68 KG/M2 | TEMPERATURE: 96.1 F | SYSTOLIC BLOOD PRESSURE: 119 MMHG | OXYGEN SATURATION: 98 %

## 2019-02-23 DIAGNOSIS — Z23 NEED FOR VACCINATION: ICD-10-CM

## 2019-02-23 DIAGNOSIS — T14.8XXA PUNCTURE WOUND: Primary | ICD-10-CM

## 2019-02-23 PROCEDURE — 90471 IMMUNIZATION ADMIN: CPT | Performed by: PHYSICIAN ASSISTANT

## 2019-02-23 PROCEDURE — 90714 TD VACC NO PRESV 7 YRS+ IM: CPT | Performed by: PHYSICIAN ASSISTANT

## 2019-02-23 PROCEDURE — 99213 OFFICE O/P EST LOW 20 MIN: CPT | Performed by: PHYSICIAN ASSISTANT

## 2019-02-23 ASSESSMENT — ENCOUNTER SYMPTOMS
ABDOMINAL PAIN: 0
COUGH: 0
PALPITATIONS: 0
EYE REDNESS: 0
ARTHRALGIAS: 0
CHILLS: 0
SORE THROAT: 0
RHINORRHEA: 0
EYE ITCHING: 0
EYE PAIN: 0
EYE DISCHARGE: 0
CONSTIPATION: 0
UNEXPECTED WEIGHT CHANGE: 0
WHEEZING: 0
MYALGIAS: 0
VOMITING: 0
FATIGUE: 0
FEVER: 0
SINUS PAIN: 0
NAUSEA: 0
SHORTNESS OF BREATH: 0
SINUS PRESSURE: 0
DIARRHEA: 0

## 2019-02-23 NOTE — PROGRESS NOTES
SUBJECTIVE:   Koko Serrano is a 59 year old male presenting with a chief complaint of   Chief Complaint   Patient presents with     Injury     shot a nail through finger last night- middle finger of left hand and also the finger next to it, swelling, pain, needs tetanus        He is an established patient of [facility].    Puncture wound    Onset 1 day.   Course. Small finishing nail went through fingertip of left middle finger and then into left ring finger  Severity: patient reporting mild pain  Current and Associated symptoms: painful .  Treatment measures tried include: tylenol/ibuprofen    Review of Systems   Constitutional: Negative for chills, fatigue, fever and unexpected weight change.   HENT: Negative for congestion, ear pain, rhinorrhea, sinus pressure, sinus pain and sore throat.    Eyes: Negative for pain, discharge, redness and itching.   Respiratory: Negative for cough, shortness of breath and wheezing.    Cardiovascular: Negative for chest pain, palpitations and leg swelling.   Gastrointestinal: Negative for abdominal pain, constipation, diarrhea, nausea and vomiting.   Musculoskeletal: Negative for arthralgias and myalgias.   Skin: Negative for rash.        Puncture wound       Past Medical History:   Diagnosis Date     Injury, other and unspecified, elbow, forearm, and wrist      Unspecified disorder of lipoid metabolism      Family History   Problem Relation Age of Onset     Hypertension Mother      Circulatory Maternal Grandmother         aneurysm-AAA     Alcohol/Drug Maternal Grandfather      Circulatory Paternal Grandmother         Blood clot     Hypertension Brother      Alcohol/Drug Brother      Current Outpatient Medications   Medication Sig Dispense Refill     amoxicillin-clavulanate (AUGMENTIN) 875-125 MG tablet Take 1 tablet by mouth 2 times daily for 10 days 20 tablet 0     Ascorbic Acid (VITAMIN C) 500 MG CHEW Take 1 chew tab by mouth daily as needed       Cholecalciferol (VITAMIN D)  2000 UNITS CAPS Take 1 tablet by mouth daily.       cyanocobalamin 1000 MCG SUBL sublingual tablet Place 1,000 mcg under the tongue daily       FISH OIL 1000 MG PO CAPS 1 CAPSULES DAILY        GLUCOSAMINE CHONDRO COMPLEX OR 1 tablet by mouth daily       Zinc Sulfate (ZINC 15 PO)        albuterol (PROAIR HFA/PROVENTIL HFA/VENTOLIN HFA) 108 (90 Base) MCG/ACT Inhaler Inhale 2 puffs into the lungs every 6 hours as needed for shortness of breath / dyspnea or wheezing (Patient not taking: Reported on 2/23/2019) 1 Inhaler 0     benzonatate (TESSALON PERLES) 100 MG capsule Take 1 capsule (100 mg) by mouth 3 times daily as needed for cough (Patient not taking: Reported on 2/23/2019) 42 capsule 0     predniSONE (DELTASONE) 20 MG tablet Take 2 tablets daily for 5 days (Patient not taking: Reported on 2/23/2019.) 10 tablet 0     Social History     Tobacco Use     Smoking status: Never Smoker     Smokeless tobacco: Never Used   Substance Use Topics     Alcohol use: Yes     Comment: rare       OBJECTIVE  /75   Pulse 59   Temp 96.1  F (35.6  C) (Tympanic)   Resp 16   Wt 74.8 kg (165 lb)   SpO2 98%   BMI 23.68 kg/m      Physical Exam   Constitutional: He appears well-developed and well-nourished. No distress.   Skin:   2 pinpoint puncture wounds on tip of left middle finger and one on the side of left ring finger. No active bleeding. Patient has full active ROM of both fingers. Mild tenderness with palpation. No signs of infection.    Psychiatric: He has a normal mood and affect. His speech is normal and behavior is normal.       Labs:  No results found for this or any previous visit (from the past 24 hour(s)).    X-Ray was not done.    ASSESSMENT:      ICD-10-CM    1. Puncture wound T14.8XXA amoxicillin-clavulanate (AUGMENTIN) 875-125 MG tablet     TD PRSERV FREE >=7 YRS ADS IM [14934]     1st  Administration  [[id number]]   2. Need for vaccination Z23 TD PRSERV FREE >=7 YRS ADS IM [71104]     1st  Administration  [[id number]]         Medical Decision Making:    Differential Diagnosis:  Puncture wound    Serious Comorbid Conditions:  Adult:  None    PLAN:    Tetanus updates. Keep area clean and dry. Gave written prescription for Augmentin that he can fill if he develops sings of infection.     Followup:    If not improving or if condition worsens, follow up with your Primary Care Provider    There are no Patient Instructions on file for this visit.

## 2019-02-23 NOTE — TELEPHONE ENCOUNTER
Koko calling with concerns that he accidentally shot a finishing nail through his finger and is wondering when he had a tetanus shot and if one is recommended. Refuses ER disposition and triage.     Disposition: See a physician within 3 days for tetanus booster. Koko plans to go to the [facility] today.     RN advised to call back with any changes, worsening of symptoms, and questions or concerns.   Gabbie Garcia RN/FNA      Reason for Disposition    [1] Last tetanus shot > 5 years ago AND [2] DIRTY cut or scrape    Additional Information    Negative: Wound looks infected    Negative: Caused by animal bite    Negative: Caused by human bite    Negative: Amputated finger    Negative: Skin is split open or gaping  (or length > 1/2 inch or 12 mm)    Negative: [1] Bleeding AND [2] won't stop after 10 minutes of direct pressure (using correct technique)    Negative: [1] Dirt in the wound AND [2] not removed with 15 minutes of scrubbing    Negative: High pressure injection injury (e.g., from grease gun or paint gun, usually work-related)    Protocols used: FINGER INJURY-ADULT-

## 2019-02-27 ASSESSMENT — ENCOUNTER SYMPTOMS: ROS SKIN COMMENTS: PUNCTURE WOUND

## 2019-07-27 ENCOUNTER — NURSE TRIAGE (OUTPATIENT)
Dept: NURSING | Facility: CLINIC | Age: 59
End: 2019-07-27

## 2019-07-27 NOTE — TELEPHONE ENCOUNTER
Koko is very dizzy like he wants to fall down, upset stomach and cold sweats.  Symptoms started last night.  Koko is not able to carry out normal activities.      Reason for Disposition    SEVERE dizziness (e.g., unable to stand, requires support to walk, feels like passing out now)    Additional Information    Negative: Severe difficulty breathing (e.g., struggling for each breath, speaks in single words)    Negative: [1] Difficulty breathing or swallowing AND [2] started suddenly after medicine, an allergic food or bee sting    Negative: Shock suspected (e.g., cold/pale/clammy skin, too weak to stand, low BP, rapid pulse)    Negative: Difficult to awaken or acting confused (e.g., disoriented, slurred speech)    Negative: [1] Weakness (i.e., paralysis, loss of muscle strength) of the face, arm or leg on one side of the body AND [2] sudden onset AND [3] present now    Negative: [1] Numbness (i.e., loss of sensation) of the face, arm or leg on one side of the body AND [2] sudden onset AND [3] present now    Negative: Difficulty breathing    Negative: [1] Loss of speech or garbled speech AND [2] sudden onset AND [3] present now    Negative: Overdose (accidental or intentional) of medications    Negative: [1] Fainted > 15 minutes ago AND [2] still feels too weak or dizzy to stand    Negative: Heart beating < 50 beats per minute OR > 140 beats per minute    Negative: Sounds like a life-threatening emergency to the triager    Protocols used: DIZZINESS - [id number]

## 2020-01-27 ENCOUNTER — OFFICE VISIT (OUTPATIENT)
Dept: FAMILY MEDICINE | Facility: CLINIC | Age: 60
End: 2020-01-27
Payer: COMMERCIAL

## 2020-01-27 VITALS
OXYGEN SATURATION: 98 % | RESPIRATION RATE: 18 BRPM | HEART RATE: 105 BPM | BODY MASS INDEX: 25.8 KG/M2 | DIASTOLIC BLOOD PRESSURE: 64 MMHG | WEIGHT: 180.2 LBS | HEIGHT: 70 IN | SYSTOLIC BLOOD PRESSURE: 110 MMHG | TEMPERATURE: 98.6 F

## 2020-01-27 DIAGNOSIS — J20.9 ACUTE BRONCHITIS WITH SYMPTOMS > 10 DAYS: Primary | ICD-10-CM

## 2020-01-27 PROCEDURE — 99213 OFFICE O/P EST LOW 20 MIN: CPT | Performed by: PHYSICIAN ASSISTANT

## 2020-01-27 RX ORDER — AZITHROMYCIN 250 MG/1
TABLET, FILM COATED ORAL
Qty: 6 TABLET | Refills: 0 | Status: SHIPPED | OUTPATIENT
Start: 2020-01-27 | End: 2020-05-27

## 2020-01-27 RX ORDER — BENZONATATE 200 MG/1
200 CAPSULE ORAL 3 TIMES DAILY PRN
Qty: 30 CAPSULE | Refills: 0 | Status: SHIPPED | OUTPATIENT
Start: 2020-01-27 | End: 2020-05-27

## 2020-01-27 ASSESSMENT — ENCOUNTER SYMPTOMS
FATIGUE: 1
CARDIOVASCULAR NEGATIVE: 1
MUSCULOSKELETAL NEGATIVE: 1
COUGH: 1
EYES NEGATIVE: 1
GASTROINTESTINAL NEGATIVE: 1
NEUROLOGICAL NEGATIVE: 1
SORE THROAT: 1

## 2020-01-27 ASSESSMENT — MIFFLIN-ST. JEOR: SCORE: 1623.63

## 2020-01-27 NOTE — PROGRESS NOTES
"Subjective     Koko Serrano is a 60 year old male who presents to clinic today for the following health issues:    Patient presents with a productive cough, yellow, x 3 weeks.  Unknown fevers, ST, no ear pain, no SOB.  Significant coughing.  Tried tylenol, motrin, dayquil and niquil.       ENT Symptoms             Symptoms: cc Present Absent Comment   Fever/Chills  x     Fatigue  x     Muscle Aches  x     Eye Irritation  x     Sneezing  x     Nasal Owen/Drg  x     Sinus Pressure/Pain  x     Loss of smell  x     Dental pain   x    Sore Throat  x  From coughing constantly and drainage in throat   Swollen Glands  x     Ear Pain/Fullness   x    Cough x x  Constant all day and night a dry wheeze   Wheeze  x     Chest Pain  x  Hurts in chest bu heart   Shortness of breath  x     Rash   x    Other  x  Diarrhea every time he eats he has a bowel movement. Been feeling dizzy.     Symptom duration:  3-4 weeks   Symptom severity:  severe   Treatments tried:  Airborne, Nyquil, dayquil, ibuprofen, theraflu   Contacts:  wife has same thing       PROBLEMS TO ADD ON...        Reviewed and updated as needed this visit by Provider         Review of Systems   Constitutional: Positive for fatigue.   HENT: Positive for congestion and sore throat.    Eyes: Negative.    Respiratory: Positive for cough.    Cardiovascular: Negative.    Gastrointestinal: Negative.    Musculoskeletal: Negative.    Skin: Negative.    Neurological: Negative.    All other systems reviewed and are negative.     ROS COMP: Constitutional, HEENT, cardiovascular, pulmonary, gi and gu systems are negative, except as otherwise noted.      Objective    /64 (BP Location: Right arm, Patient Position: Sitting, Cuff Size: Adult Regular)   Pulse 105   Temp 98.6  F (37  C) (Tympanic)   Resp 18   Ht 1.778 m (5' 10\")   Wt 81.7 kg (180 lb 3.2 oz)   SpO2 98%   BMI 25.86 kg/m    Body mass index is 25.86 kg/m .  Physical Exam  Vitals signs and nursing note reviewed. "   Constitutional:       General: He is not in acute distress.     Appearance: Normal appearance. He is normal weight. He is not ill-appearing, toxic-appearing or diaphoretic.   HENT:      Head: Normocephalic and atraumatic.      Right Ear: Tympanic membrane, ear canal and external ear normal.      Left Ear: Tympanic membrane, ear canal and external ear normal.      Nose: Nose normal.      Mouth/Throat:      Mouth: Mucous membranes are moist.      Pharynx: Oropharynx is clear. Posterior oropharyngeal erythema present.   Eyes:      Extraocular Movements: Extraocular movements intact.      Conjunctiva/sclera: Conjunctivae normal.   Neck:      Musculoskeletal: Normal range of motion and neck supple. No neck rigidity or muscular tenderness.   Cardiovascular:      Rate and Rhythm: Normal rate and regular rhythm.      Pulses: Normal pulses.      Heart sounds: Normal heart sounds.   Pulmonary:      Effort: Pulmonary effort is normal. No respiratory distress.      Breath sounds: Normal breath sounds. No stridor. No wheezing or rhonchi.      Comments: Patient coughs throughout exam  Skin:     General: Skin is warm and dry.      Capillary Refill: Capillary refill takes less than 2 seconds.   Neurological:      General: No focal deficit present.      Mental Status: He is alert and oriented to person, place, and time.   Psychiatric:         Mood and Affect: Mood normal.         Behavior: Behavior normal.        GENERAL: healthy, alert and no distress  NECK: no adenopathy, no asymmetry, masses, or scars and thyroid normal to palpation  RESP: lungs clear to auscultation - no rales, rhonchi or wheezes  CV: regular rate and rhythm, normal S1 S2, no S3 or S4, no murmur, click or rub, no peripheral edema and peripheral pulses strong  ABDOMEN: soft, nontender, no hepatosplenomegaly, no masses and bowel sounds normal  MS: no gross musculoskeletal defects noted, no edema    Diagnostic Test Results:  Labs reviewed in Epic  none      "    Assessment & Plan       ICD-10-CM    1. Acute bronchitis with symptoms > 10 days J20.9 azithromycin (ZITHROMAX) 250 MG tablet     benzonatate (TESSALON) 200 MG capsule        BMI:   Estimated body mass index is 25.86 kg/m  as calculated from the following:    Height as of this encounter: 1.778 m (5' 10\").    Weight as of this encounter: 81.7 kg (180 lb 3.2 oz).           MEDICATIONS:  Continue current medications without change    Return in about 1 week (around 2/3/2020), or if symptoms worsen or fail to improve, for With your primary care provider.      wilson Smith PA-C  [facility]    "

## 2020-01-27 NOTE — PATIENT INSTRUCTIONS

## 2020-01-29 ENCOUNTER — TELEPHONE (OUTPATIENT)
Dept: FAMILY MEDICINE | Facility: CLINIC | Age: 60
End: 2020-01-29

## 2020-01-29 DIAGNOSIS — J20.9 ACUTE BRONCHITIS WITH SYMPTOMS > 10 DAYS: Primary | ICD-10-CM

## 2020-01-29 DIAGNOSIS — R05.9 COUGH: ICD-10-CM

## 2020-01-29 RX ORDER — ALBUTEROL SULFATE 90 UG/1
2 AEROSOL, METERED RESPIRATORY (INHALATION) EVERY 4 HOURS PRN
Qty: 1 INHALER | Refills: 1 | Status: SHIPPED | OUTPATIENT
Start: 2020-01-29 | End: 2020-05-27

## 2020-01-29 NOTE — TELEPHONE ENCOUNTER
Reason for call:  Patient reporting a symptom    Symptom or request: Koko saw Kathia Smith on 1/27/20 in [place]. He is on Azithromycin and Tessalon for his bronchitis but says he can't control his cough and is wheezing. (Pt had a hard time talking on the phone due to his coughing). He wonders if he could get an inhaler. He says he has had to use them in the past and it has helped.         Have you been treated for this before? Yes    Additional comments: [facility] pharmacy    Phone Number patient can be reached at:  cell [phone]    Best Time:  anytime    Can we leave a detailed message on this number:  YES    Call taken on 1/29/2020 at 8:53 AM by Ijeoma Ribeiro

## 2020-01-29 NOTE — TELEPHONE ENCOUNTER
I send albuterol to the pharmacy but if this is not effective he needs to be evaluated in the clinic right away.     Thanks,     CHRISTIAN Maldonado CNP

## 2020-05-26 ENCOUNTER — TELEPHONE (OUTPATIENT)
Dept: INTERNAL MEDICINE | Facility: CLINIC | Age: 60
End: 2020-05-26

## 2020-05-26 NOTE — TELEPHONE ENCOUNTER
Reason for call:  Patient reporting a symptom    Symptom or request: patient would like to know what to do he thinks he tore his rotator cuff .     Duration (how long have symptoms been present): thursday    Have you been treated for this no  Phone Number patient can be reached at:  Cell number on file:    Telephone Information:   Mobile [phone]       Best Time:  any    Can we leave a detailed message on this number:  YES    Call taken on 5/26/2020 at 11:10 AM by Kathia Schroeder

## 2020-05-26 NOTE — TELEPHONE ENCOUNTER
Patient is called and he was shoveling rocks and his arm hurts now.  Is wearing a sling.  Thinks its his rotator cuff and wants a MRI.  Needs to be seen, appt made. Harriet RIVERA RN

## 2020-05-27 ENCOUNTER — OFFICE VISIT (OUTPATIENT)
Dept: FAMILY MEDICINE | Facility: CLINIC | Age: 60
End: 2020-05-27
Payer: COMMERCIAL

## 2020-05-27 VITALS
DIASTOLIC BLOOD PRESSURE: 74 MMHG | OXYGEN SATURATION: 97 % | SYSTOLIC BLOOD PRESSURE: 116 MMHG | WEIGHT: 178 LBS | RESPIRATION RATE: 12 BRPM | BODY MASS INDEX: 25.54 KG/M2 | TEMPERATURE: 96.5 F | HEART RATE: 68 BPM

## 2020-05-27 DIAGNOSIS — M25.512 ACUTE PAIN OF LEFT SHOULDER: Primary | ICD-10-CM

## 2020-05-27 DIAGNOSIS — B34.9 VIRAL ILLNESS: ICD-10-CM

## 2020-05-27 PROCEDURE — 99214 OFFICE O/P EST MOD 30 MIN: CPT | Performed by: INTERNAL MEDICINE

## 2020-05-27 NOTE — PROGRESS NOTES
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    Alleviating factors:Improved by: nothing    Therapies Tried and outcome: Wearing a Sling, sleeping in recliner.  Ibuprofen 400 mg TID.  Ibuprofen minimally helpful.      Woke up with severe pain the day after heavy lifting.    All movements cause severe pain.  Feels weakness in the hand. Also noted swelling in hand, and unable to make a fist due to swelling.      Severe pain with laying on the left shoulder.    Has had intermittent chronic left shoulder pain for years, but diffuse days rest helped           Current Outpatient Medications   Medication Sig Dispense Refill     Ascorbic Acid (VITAMIN C) 500 MG CHEW Take 1 chew tab by mouth daily as needed       Cholecalciferol (VITAMIN D) 2000 UNITS CAPS Take 1 tablet by mouth daily.       cyanocobalamin 1000 MCG SUBL sublingual tablet Place 1,000 mcg under the tongue daily       FISH OIL 1000 MG PO CAPS 1 CAPSULES DAILY        GLUCOSAMINE CHONDRO COMPLEX OR 1 tablet by mouth daily       Zinc Sulfate (ZINC 15 PO)            Reviewed and updated as needed this visit by Provider  Problems         Review of Systems   Constitutional, HEENT, cardiovascular, pulmonary, gi and gu systems are negative, except as otherwise noted.      Objective    /74   Pulse 68   Temp 96.5  F (35.8  C) (Tympanic)   Resp 12   Wt 80.7 kg (178 lb)   SpO2 97%   BMI 25.54 kg/m    Body mass  "index is 25.54 kg/m .  Physical Exam   GENERAL APPEARANCE: healthy, alert and no distress  ORTHO:   SHOULDER LEFT Exam-Left   Inspection: no swelling, no bruising, no discoloration, no obvious deformity, no asymmetry, no glenohumeral joint anterior bulge, no distal clavicle elevation, no muscle atrophy, no scapular winging   Tenderness of: SC joint- no , clavicle(prox-mid)- no , clavicle-(mid-distal)- no , AC joint- no , acromion- no , anterior capsule- YES, prox bicep tendon- YES, greater tuberosity- no , prox humerus- no , supraspinatous- no , infraspinatous- no , superior trapezious- no , rhomboids- no    Range of Motion: Active- severely limited in all ROM fields due to pain.  Range of Motion: Passive- patient unable to do due to severe pain   Strength: forward flexion- 5/5, 4/5, abduction- 5/5, 4/5, internal rotation- 5/5, 4/5, external rotation- 5/5, 4/5 and bicep- weakness   Special tests: unable to do any tests due to severe pain                  Assessment & Plan     1. Acute pain of left shoulder - due to severe pain, MRI ordered.  Suspect acute on chronic rotator cuff tear or possible bicep tendon tear.  - MR Shoulder Left w/o Contrast; Future  - Orthopedic & Spine  Referral; Future    2. Viral illness -patient had severe prolonged viral illness earlier this year and wonders if he had COVID-19.  He would like to have the antibody test if possible  - COVID-19 Virus (Coronavirus) Antibody; Future     BMI:   Estimated body mass index is 25.54 kg/m  as calculated from the following:    Height as of 1/27/20: 1.778 m (5' 10\").    Weight as of this encounter: 80.7 kg (178 lb).   Weight management plan: Patient was referred to their PCP to discuss a diet and exercise plan.        Patient Instructions   1. In July or Aug, recommend you schedule a physical with a provider of your choice.  2. Order for COVID antibody test  3. Radiology test was ordered.  Please call [phone] to schedule.  4. Referral to " sports medicine  5. Ok to continue with sling for comfort  6. Try ice frequently, every 1-2 hours, for 20 min at a time  7. Try ibuprofen alternating with Tylenol.  Ibuprofen 600 mg up to 4 x day (take with food) and Tylenol 500-1000 mg up to 3 x day.      Return in about 2 weeks (around 6/10/2020) for If symptoms don't improve or if they worsen.    Liliana Babin, DO  [facility]

## 2020-05-27 NOTE — PATIENT INSTRUCTIONS
1. In July or Aug, recommend you schedule a physical with a provider of your choice.  2. Order for COVID antibody test  3. Radiology test was ordered.  Please call [phone] to schedule.  4. Referral to sports medicine  5. Ok to continue with sling for comfort  6. Try ice frequently, every 1-2 hours, for 20 min at a time  7. Try ibuprofen alternating with Tylenol.  Ibuprofen 600 mg up to 4 x day (take with food) and Tylenol 500-1000 mg up to 3 x day.

## 2020-05-28 DIAGNOSIS — B34.9 VIRAL ILLNESS: ICD-10-CM

## 2020-05-28 PROCEDURE — 99000 SPECIMEN HANDLING OFFICE-LAB: CPT | Performed by: INTERNAL MEDICINE

## 2020-05-28 PROCEDURE — 86769 SARS-COV-2 COVID-19 ANTIBODY: CPT | Mod: 90 | Performed by: INTERNAL MEDICINE

## 2020-05-28 PROCEDURE — 36415 COLL VENOUS BLD VENIPUNCTURE: CPT | Performed by: INTERNAL MEDICINE

## 2020-05-29 ENCOUNTER — HOSPITAL ENCOUNTER (OUTPATIENT)
Dept: MRI IMAGING | Facility: CLINIC | Age: 60
Discharge: HOME | End: 2020-05-29
Attending: INTERNAL MEDICINE | Admitting: INTERNAL MEDICINE
Payer: COMMERCIAL

## 2020-05-29 DIAGNOSIS — M25.512 ACUTE PAIN OF LEFT SHOULDER: ICD-10-CM

## 2020-05-29 PROCEDURE — 73221 MRI JOINT UPR EXTREM W/O DYE: CPT | Mod: LT

## 2020-05-29 NOTE — RESULT ENCOUNTER NOTE
Please call patient    Multiple changes seen    There is a small tear of one of the rotator cuff tendons.  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  There is a tear of the bicep tendon.    Recommend to see Ortho as recommended at time of visit.

## 2020-06-04 ENCOUNTER — TRANSFERRED RECORDS (OUTPATIENT)
Dept: HEALTH INFORMATION MANAGEMENT | Facility: CLINIC | Age: 60
End: 2020-06-04

## 2020-06-05 LAB
COVID-19 SPIKE RBD ABY TITER: NORMAL
COVID-19 SPIKE RBD ABY: NEGATIVE

## 2020-11-07 ENCOUNTER — VIRTUAL VISIT (OUTPATIENT)
Dept: FAMILY MEDICINE | Facility: OTHER | Age: 60
End: 2020-11-07

## 2020-11-07 NOTE — PROGRESS NOTES
"Date: 2020 14:17:07  Clinician: Juan Pryor  Clinician NPI: [id number]  Patient: Koko Serrano  Patient : 1960  Patient Address: [address] [place] [address], [place] [address]  Patient Phone: [phone]  Visit Protocol: URI  Patient Summary:  Koko is a 60 year old ( : 1960 ) male who initiated a [facility] Visit for COVID-19 (Coronavirus) evaluation and screening. When asked the question \"Please sign me up to receive news, health information and promotions. \", Koko responded \"No\".    Koko states his symptoms started gradually 3-4 days ago.   His symptoms consist of rhinitis, myalgia, chills, malaise, a sore throat, a headache, enlarged lymph nodes, and nasal congestion. He is experiencing mild difficulty breathing with activities but can speak normally in full sentences. Koko also feels feverish.   Symptom details     Nasal secretions: The color of his mucus is yellow, white, and clear.    Sore throat: Koko reports having mild throat pain (1-3 on a 10 point pain scale), does not have exudate on his tonsils, and can swallow liquids. The lymph nodes in his neck are enlarged. A rash has not appeared on the skin since the sore throat started.     Temperature: His current temperature is 100.4 degrees Fahrenheit. Koko has had a temperature over 100 degrees Fahrenheit for 3-4 days.     Headache: He states the headache is moderate (4-6 on a 10 point pain scale).      Koko denies having vomiting, facial pain or pressure, teeth pain, ageusia, diarrhea, ear pain, wheezing, cough, nausea, and anosmia. He also denies taking antibiotic medication in the past month, having recent facial or sinus surgery in the past 60 days, double sickening (worsening symptoms after initial improvement), and having a sinus infection within the past year.   Precipitating events  Within the past week, Koko has not been exposed to someone with strep throat. He has not recently been exposed to someone with influenza. " Koko has been in close contact with the following high risk individuals: adults 65 or older and children under the age of 5.   Pertinent COVID-19 (Coronavirus) information  Koko does not work or volunteer as healthcare worker or a . In the past 14 days, Koko has not worked or volunteered at a healthcare facility or group living setting.   In the past 14 days, he also has not lived in a congregate living setting.   Koko has had a close contact with a laboratory-confirmed COVID-19 patient within 14 days of symptom onset. He was exposed at his work. Date Koko was exposed to the laboratory-confirmed COVID-19 patient: 10/27/2020   Additional information about contact with COVID-19 (Coronavirus) patient as reported by the patient (free text): 10/27-11-3. Dates    Since December 2019, Koko has not been tested for COVID-19 and has not had upper respiratory infection or influenza-like illness.   Triage Point(s) temporarily suspended for COVID-19 (Coronavirus) screening  Koko reported the following symptoms which were previously protocol referral points. These protocol referral points have temporarily been removed for purposes of COVID-19 (Coronavirus) screening.   Pertinent medical history  Koko does not need a return to work/school note.   Weight: 175 lbs   Koko does not smoke or use smokeless tobacco.   Additional information as reported by the patient (free text): Rundown tired dizzy ache all over couldn't walk for a couple days   Weight: 175 lbs    MEDICATIONS: No current medications, ALLERGIES: NKDA  Clinician Response:  Dear Koko,   Your symptoms show that you may have coronavirus (COVID-19). This illness can cause fever, cough and trouble breathing. Many people get a mild case and get better on their own. Some people can get very sick.  What should I do?  We would like to test you for this virus.   1. Please call [phone] to schedule your visit. Explain that you were  "[facility]. Be ready to share your [facility] visit ID number.  * If you need to schedule in [facility] please call [phone] or for [employer] employees please call [phone].  * If you need to schedule in the [place] area please call [phone]. [place] employees call [phone].  The following will serve as your written order for this COVID Test, ordered by me, for the indication of suspected COVID [Z20.828]: The test will be ordered in [employer], our electronic health record, after you are scheduled. It will show as ordered and authorized by Rosendo Steel MD.  Order: COVID-19 (Coronavirus) PCR for SYMPTOMATIC testing from [facility].   2. When it's time for your COVID test:  Stay at least 6 feet away from others. (If someone will drive you to your test, stay in the backseat, as far away from the  as you can.)   Cover your mouth and nose with a mask, tissue or washcloth.  Go straight to the testing site. Don't make any stops on the way there or back.      3.Starting now: Stay home and away from others (self-isolate) until:   You've had no fever---and no medicine that reduces fever---for one full day (24 hours). And...   Your other symptoms have gotten better. For example, your cough or breathing has improved. And...   At least 10 days have passed since your symptoms started.       During this time, don't leave the house except for testing or medical care.   Stay in your own room, even for meals. Use your own bathroom if you can.   Stay away from others in your home. No hugging, kissing or shaking hands. No visitors.  Don't go to work, school or anywhere else.    Clean \"high touch\" surfaces often (doorknobs, counters, handles, etc.). Use a household cleaning spray or wipes. You'll find a full list of  on the EPA website: www.epa.gov/pesticide-registration/list-n-disinfectants-use-against-sars-cov-2.   Cover your mouth and nose with a mask, tissue or washcloth to avoid " spreading germs.  Wash your hands and face often. Use soap and water.  Caregivers in these groups are at risk for severe illness due to COVID-19:  o People 65 years and older  o People who live in a nursing home or long-term care facility  o People with chronic disease (lung, heart, cancer, diabetes, kidney, liver, immunologic)  o People who have a weakened immune system, including those who:   Are in cancer treatment  Take medicine that weakens the immune system, such as corticosteroids  Had a bone marrow or organ transplant  Have an immune deficiency  Have poorly controlled HIV or AIDS  Are obese (body mass index of 40 or higher)  Smoke regularly   o Caregivers should wear gloves while washing dishes, handling laundry and cleaning bedrooms and bathrooms.  o Use caution when washing and drying laundry: Don't shake dirty laundry, and use the warmest water setting that you can.  o For more tips, go to www.cdc.gov/coronavirus/2019-ncov/downloads/10Things.pdf.    4.Sign up for [employer]. We know it's scary to hear that you might have COVID-19. We want to track your symptoms to make sure you're okay over the next 2 weeks. Please look for an email from [employer]---this is a free, online program that we'll use to keep in touch. To sign up, follow the link in the email. Learn more at [email or web address]  How can I take care of myself?   Get lots of rest. Drink extra fluids (unless a doctor has told you not to).   Take Tylenol (acetaminophen) for fever or pain. If you have liver or kidney problems, ask your family doctor if it's okay to take Tylenol.   Adults can take either:    650 mg (two 325 mg pills) every 4 to 6 hours, or...   1,000 mg (two 500 mg pills) every 8 hours as needed.    Note: Don't take more than 3,000 mg in one day. Acetaminophen is found in many medicines (both prescribed and over-the-counter medicines). Read all labels to be sure you don't take too much.   For children, check the  Tylenol bottle for the right dose. The dose is based on the child's age or weight.    If you have other health problems (like cancer, heart failure, an organ transplant or severe kidney disease): Call your specialty clinic if you don't feel better in the next 2 days.       Know when to call 911. Emergency warning signs include:    Trouble breathing or shortness of breath Pain or pressure in the chest that doesn't go away Feeling confused like you haven't felt before, or not being able to wake up Bluish-colored lips or face.  Where can I get more information?   [facility] -- About COVID-19: [email or web address]/   CDC -- What to Do If You're Sick: www.cdc.gov/coronavirus/2019-ncov/about/steps-when-sick.html   [facility] -- Ending Home Isolation: www.cdc.gov/coronavirus/2019-ncov/hcp/disposition-in-home-patients.html   [facility] -- Caring for Someone: www.cdc.gov/coronavirus/2019-ncov/if-you-are-sick/care-for-someone.html   [facility] -- Interim Guidance for Hospital Discharge to Home: www.[facility].[place]/diseases/coronavirus/hcp/hospdischarge.pdf   [facility] clinical trials (COVID-19 research studies): clinicalaffairs.[facility]-clinical-trials    Below are the COVID-19 hotlines at the [place] Department of Health ([facility]). Interpreters are available.    For health questions: Call 116-290-1566 or 1-612.512.6848 (7 a.m. to 7 p.m.) For questions about schools and childcare: Call 237-979-4837 or 1-762.545.8841 (7 a.m. to 7 p.m.)    COVID-19 (Coronavirus) General Information  Because there is currently no vaccine to prevent infection, the best way to protect yourself is to avoid being exposed to this virus. Common symptoms of COVID-19 include but are not limited to fever, cough, and shortness of breath. These symptoms appear 2-14 days after you are exposed to the virus that causes COVID-19. Click here for more information from the CDC on how to protect yourself.  If you are sick with COVID-19 or suspect  you are infected with the virus that causes COVID-19, follow the steps here from the CDC to help prevent the disease from spreading to people in your home and community.  Click here for general information from the CDC on testing.  If you develop any of these emergency warning signs for COVID-19, get medical attention immediately:     Trouble breathing    Persistent pain or pressure in the chest    New confusion or inability to arouse    Bluish lips or face      Call your doctor or clinic before going in. Call 911 if you have a medical emergency and notify the  you have or think you may have COVID-19.  For more detailed and up to date information on COVID-19 (Coronavirus), please visit the CDC website.   Diagnosis: Other malaise  Diagnosis ICD: R53.81

## 2020-11-10 ENCOUNTER — AMBULATORY - HEALTHEAST (OUTPATIENT)
Dept: FAMILY MEDICINE | Facility: CLINIC | Age: 60
End: 2020-11-10

## 2020-11-10 DIAGNOSIS — Z20.822 SUSPECTED COVID-19 VIRUS INFECTION: ICD-10-CM

## 2020-11-12 ENCOUNTER — COMMUNICATION - HEALTHEAST (OUTPATIENT)
Dept: SCHEDULING | Facility: CLINIC | Age: 60
End: 2020-11-12

## 2023-03-21 ENCOUNTER — TRANSFERRED RECORDS (OUTPATIENT)
Dept: HEALTH INFORMATION MANAGEMENT | Facility: CLINIC | Age: 63
End: 2023-03-21

## 2023-04-05 ENCOUNTER — TRANSFERRED RECORDS (OUTPATIENT)
Dept: HEALTH INFORMATION MANAGEMENT | Facility: CLINIC | Age: 63
End: 2023-04-05

## 2023-07-10 ENCOUNTER — ANCILLARY PROCEDURE (OUTPATIENT)
Dept: GENERAL RADIOLOGY | Facility: CLINIC | Age: 63
End: 2023-07-10
Attending: PHYSICIAN ASSISTANT
Payer: COMMERCIAL

## 2023-07-10 ENCOUNTER — OFFICE VISIT (OUTPATIENT)
Dept: FAMILY MEDICINE | Facility: CLINIC | Age: 63
End: 2023-07-10
Payer: COMMERCIAL

## 2023-07-10 VITALS
BODY MASS INDEX: 26.19 KG/M2 | WEIGHT: 176.8 LBS | DIASTOLIC BLOOD PRESSURE: 78 MMHG | OXYGEN SATURATION: 98 % | HEIGHT: 69 IN | HEART RATE: 65 BPM | SYSTOLIC BLOOD PRESSURE: 130 MMHG | RESPIRATION RATE: 18 BRPM

## 2023-07-10 DIAGNOSIS — M54.41 CHRONIC BILATERAL LOW BACK PAIN WITH RIGHT-SIDED SCIATICA: ICD-10-CM

## 2023-07-10 DIAGNOSIS — G89.29 CHRONIC PAIN OF LEFT KNEE: ICD-10-CM

## 2023-07-10 DIAGNOSIS — M25.562 CHRONIC PAIN OF LEFT KNEE: ICD-10-CM

## 2023-07-10 DIAGNOSIS — M54.2 CHRONIC NECK PAIN: ICD-10-CM

## 2023-07-10 DIAGNOSIS — E78.5 HYPERLIPIDEMIA LDL GOAL <130: Primary | ICD-10-CM

## 2023-07-10 DIAGNOSIS — G89.29 CHRONIC BILATERAL LOW BACK PAIN WITH RIGHT-SIDED SCIATICA: ICD-10-CM

## 2023-07-10 DIAGNOSIS — G89.29 CHRONIC NECK PAIN: ICD-10-CM

## 2023-07-10 LAB
ALBUMIN SERPL BCG-MCNC: 4.7 G/DL (ref 3.5–5.2)
ALP SERPL-CCNC: 70 U/L (ref 40–129)
ALT SERPL W P-5'-P-CCNC: 20 U/L (ref 0–70)
ANION GAP SERPL CALCULATED.3IONS-SCNC: 9 MMOL/L (ref 7–15)
AST SERPL W P-5'-P-CCNC: 24 U/L (ref 0–45)
BILIRUB SERPL-MCNC: 0.3 MG/DL
BUN SERPL-MCNC: 16.8 MG/DL (ref 8–23)
CALCIUM SERPL-MCNC: 9 MG/DL (ref 8.8–10.2)
CHLORIDE SERPL-SCNC: 106 MMOL/L (ref 98–107)
CHOLEST SERPL-MCNC: 262 MG/DL
CREAT SERPL-MCNC: 1.04 MG/DL (ref 0.67–1.17)
DEPRECATED HCO3 PLAS-SCNC: 26 MMOL/L (ref 22–29)
GFR SERPL CREATININE-BSD FRML MDRD: 80 ML/MIN/1.73M2
GLUCOSE SERPL-MCNC: 81 MG/DL (ref 70–99)
HDLC SERPL-MCNC: 37 MG/DL
LDLC SERPL CALC-MCNC: 166 MG/DL
NONHDLC SERPL-MCNC: 225 MG/DL
POTASSIUM SERPL-SCNC: 4.6 MMOL/L (ref 3.4–5.3)
PROT SERPL-MCNC: 6.9 G/DL (ref 6.4–8.3)
SODIUM SERPL-SCNC: 141 MMOL/L (ref 136–145)
TRIGL SERPL-MCNC: 293 MG/DL

## 2023-07-10 PROCEDURE — 99204 OFFICE O/P NEW MOD 45 MIN: CPT | Performed by: PHYSICIAN ASSISTANT

## 2023-07-10 PROCEDURE — 73560 X-RAY EXAM OF KNEE 1 OR 2: CPT | Mod: TC | Performed by: RADIOLOGY

## 2023-07-10 PROCEDURE — 80053 COMPREHEN METABOLIC PANEL: CPT | Performed by: PHYSICIAN ASSISTANT

## 2023-07-10 PROCEDURE — 36415 COLL VENOUS BLD VENIPUNCTURE: CPT | Performed by: PHYSICIAN ASSISTANT

## 2023-07-10 PROCEDURE — 80061 LIPID PANEL: CPT | Performed by: PHYSICIAN ASSISTANT

## 2023-07-10 PROCEDURE — 72040 X-RAY EXAM NECK SPINE 2-3 VW: CPT | Mod: TC | Performed by: RADIOLOGY

## 2023-07-10 PROCEDURE — 72100 X-RAY EXAM L-S SPINE 2/3 VWS: CPT | Mod: TC | Performed by: RADIOLOGY

## 2023-07-10 RX ORDER — ROSUVASTATIN CALCIUM 40 MG/1
40 TABLET, COATED ORAL DAILY
Qty: 90 TABLET | Refills: 3 | Status: SHIPPED | OUTPATIENT
Start: 2023-07-10 | End: 2023-10-26

## 2023-07-10 RX ORDER — ROSUVASTATIN CALCIUM 40 MG/1
40 TABLET, COATED ORAL DAILY
Qty: 90 TABLET | Refills: 3 | Status: SHIPPED | OUTPATIENT
Start: 2023-07-10 | End: 2023-07-10

## 2023-07-10 RX ORDER — TADALAFIL 5 MG/1
1 TABLET ORAL
COMMUNITY
Start: 2023-02-14 | End: 2023-10-04

## 2023-07-10 ASSESSMENT — ENCOUNTER SYMPTOMS: BACK PAIN: 1

## 2023-07-10 ASSESSMENT — PAIN SCALES - GENERAL: PAINLEVEL: MODERATE PAIN (5)

## 2023-07-10 NOTE — PROGRESS NOTES
Assessment and Plan  Hyperlipidemia LDL goal <130  Life Screening done on March 21, 2023: 272 cholesterol. Last year his cholesterol was under 200. It has been multiple years since he self discontinued his statin due to low libido and gynecomastia. Will recheck lipid panel today. He is agreeable to starting Crestor today. Check lipids. He will return for LFTs in 3 months. Encouraged regular exercise and healthy diet. Follow up in 1 year.   - Lipid panel reflex to direct LDL Non-fasting; Future  - Comprehensive metabolic panel (BMP + Alb, Alk Phos, ALT, AST, Total. Bili, TP); Future  - rosuvastatin (CRESTOR) 40 MG tablet; Take 1 tablet (40 mg) by mouth daily    Chronic bilateral low back pain with right-sided sciatica  Ongoing for many years, becomes acutely exacerbated by bending motions. Regularly sees a chiropractor for this issue. He has done physical therapy and followed with orthopedics in the past with minimal resolution of symptoms. No changes in sensation or strength. Imaging to be repeated today. Discussed treatment options including lifestyle modification, physical therapy, medication management, and referral to orthopedics. He is agreeable to trying physical therapy again, referral placed.    - Physical Therapy Referral; Future  - XR Lumbar Spine 2/3 Views; Future    Chronic pain of left knee  Known severe medial compartment OA seen on MRI in 2015. Recently has had more difficulty with walking. He has tried wearing braces with minimal relief. Saw orthopedics and physical therapy in the past. Repeat imaging today, since last was in 2013. He is agreeable to trying physical therapy today as well as consult for an  brace, referral placed. Not interested in medical management at this time. Recommend rest, ice, Tylenol or Ibuprofen for pain.   - Physical Therapy Referral; Future  - XR Knee Standing Left 2 Views; Future  - Physical Therapy Referral; Future  - Orthopedic  Referral;  Future    Chronic neck pain  Had a cervical fracture in the past. Requesting repeat imaging today. No increase from baseline pain. To repeat imaging today. Follow up for increased pain or loss of strength or sensation of extremities.   - XR Cervical Spine 2/3 Views; Future    Subjective   Koko is a 63 year old, presenting for the following health issues:  Lipids (Did a life screen and his cholesterol is 275. Says that he had been on medication but stopped taking it due to lack of sex drive and his breasts getting bigger. Was then seen at [facility] for a heart screen and now wants to follow up on those results.  ), Knee Pain, Back Pain, Thumb Discomfort, Throat Problem (Trouble swallowing ), Sleep Problem (Says he can not get more than 6 hours of sleep /), and Memory Loss        7/10/2023     1:19 PM   Additional Questions   Roomed by Mirna MARVIN CMA     History of Present Illness       Back Pain:  He presents for follow up of back pain. Patient's back pain is a chronic problem.  Location of back pain:  Right lower back and left lower back  Description of back pain: cramping, sharp and shooting  Back pain spreads: right buttocks    Since patient first noticed back pain, pain is: always present, but gets better and worse  Does back pain interfere with his job:  Yes      He eats 4 or more servings of fruits and vegetables daily.He consumes 1 sweetened beverage(s) daily.He exercises with enough effort to increase his heart rate 9 or less minutes per day.  He exercises with enough effort to increase his heart rate 3 or less days per week.   He is taking medications regularly.     Knee pain   Left knee-Ongoing for years. Was seen for this in 2015    Cholesterol   Did a life screen and his cholesterol is 275. Says that he had been on medication but stopped taking it due to lack of sex drive and his breasts getting bigger. Was then seen at [facility] for a heart screen and now wants to follow up on those results.      Thumb issue  "  Right thumb-Ongoing. Says that it often just rolls back on him. Says he feels that maybe it needs to be fused but then he worries about the strength of it     Sleep Problem   About 2 years   Says that he can not sleep more than 6 hours.   Has never had a sleep study. Says he gets up every couple hours to urinate but than can not fall back asleep     Memory   Says that he is having more trouble remembering things. Says that this bothers him as his father passed away from pelon's and since then in the last 5-6 years his brother was diagnosed with pelon's and lives in a nursing home.     Review of Systems   See HPI       Objective    /78 (BP Location: Right arm, Patient Position: Sitting, Cuff Size: Adult Regular)   Pulse 65   Resp 18   Ht 1.746 m (5' 8.75\")   Wt 80.2 kg (176 lb 12.8 oz)   SpO2 98%   BMI 26.30 kg/m    Body mass index is 26.3 kg/m .  Physical Exam   GENERAL: healthy, alert and no distress  EYES: Eyes grossly normal to inspection, conjunctivae and sclerae normal  RESP: lungs clear to auscultation - no rales, rhonchi or wheezes  CV: regular rate and rhythm, normal S1 S2, no S3 or S4, no murmur, click or rub, no peripheral edema   MS: no gross musculoskeletal defects noted. No edema. Normal active and passive ROM of L knee. Flexion and extension of back limited to pain.   SKIN: no suspicious lesions or rashes of exposed skin  NEURO: mentation intact and speech normal  PSYCH: mentation appears normal, affect normal/bright    JEANETTE Colin-Student    Physician Attestation   I, Farooq Cárdenas PA-C, was present with the medical/TARUN student who participated in the service and in the documentation of the note.  I have verified the history and personally performed the physical exam and medical decision making.  I agree with the assessment and plan of care as documented in the note.      Farooq Cárdenas PA-C            "

## 2023-07-10 NOTE — PATIENT INSTRUCTIONS
Start Crestor for high cholesterol.     Start PT for your knee and back. I hope the brace for your knee works well for you.     X-rays for your back, neck and knee for further evaluation for your symptoms.

## 2023-07-13 ENCOUNTER — THERAPY VISIT (OUTPATIENT)
Dept: PHYSICAL THERAPY | Facility: CLINIC | Age: 63
End: 2023-07-13
Attending: PHYSICIAN ASSISTANT
Payer: COMMERCIAL

## 2023-07-13 DIAGNOSIS — M25.562 CHRONIC PAIN OF LEFT KNEE: ICD-10-CM

## 2023-07-13 DIAGNOSIS — M54.41 CHRONIC BILATERAL LOW BACK PAIN WITH RIGHT-SIDED SCIATICA: ICD-10-CM

## 2023-07-13 DIAGNOSIS — G89.29 CHRONIC BILATERAL LOW BACK PAIN WITH RIGHT-SIDED SCIATICA: ICD-10-CM

## 2023-07-13 DIAGNOSIS — G89.29 CHRONIC PAIN OF LEFT KNEE: ICD-10-CM

## 2023-07-13 PROCEDURE — 97161 PT EVAL LOW COMPLEX 20 MIN: CPT | Mod: GP | Performed by: PHYSICAL MEDICINE & REHABILITATION

## 2023-07-13 PROCEDURE — 97110 THERAPEUTIC EXERCISES: CPT | Mod: GP | Performed by: PHYSICAL MEDICINE & REHABILITATION

## 2023-07-13 NOTE — PROGRESS NOTES
"PHYSICAL THERAPY EVALUATION  Type of Visit: Evaluation    See electronic medical record for Abuse and Falls Screening details.    Subjective   Pt arrived to PT today for chronic LBP and L knee pain that has been ongoing for years. 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 Notes he does go to chiro for LBP, and is helpful. Shared LBP comes and goes, while knee pain is persistent. Note she does get frequent swelling and current cyst in back of knee today. Has tried PT in the past but was not helpful. No injections in the past. Notes he does get radicular sx into L LE with episodes of LBP.  Date of onset: 07/10/23 (date of order, chronic condition)     Prior diagnostic imaging/testing results: xray--see epic for details  Prior therapy history for the same diagnosis, illness or injury: has had PT in the past 5 years ago    Prior Level of Function   Transfers: Independent  Ambulation: Independent  ADL: Independent    Living Environment  Type of home: house  Stairs to enter the home: no stairs to enter  Stairs inside the home: yes with handrail    Patient goals for therapy: improve pain    Pain assessment: Pt notes pain at best 3/10 and at worst 8/10. Pain is sharp, aching, shooting and stabbing. Notes sx intermittent, and worse with activity. Pain worse with sitting, walking, lifting, carrying, certain positions, bending, household tasks, work tasks. Better with sitting, walking, changing positions, certain positions.      Objective   LUMBAR SPINE EVALUATION  INTEGUMENTARY (edema, incisions): no palpable edema  POSTURE: Standing Posture: Rounded shoulders, Forward head  GAIT:   Weightbearing Status: WBAT  Assistive Device(s): None  Gait Deviations: decreased stance time on L, decreased heel strike/toe off  NON-WEIGHTBEARING ALIGNMENT: pt's feet supinated in supine  Lumbar AROM: flexion: 8\" from toes (slight increase in L LBP), extension: 25% limited (pain in low back), SB B: 3\" proximal to patella, Rotation B: WNL (notes " slight increase in sx)  Knee AROM: 0-145* (no pain)  PELVIC/SI SCREEN: R anterior and L posterior innominates  STRENGTH: global hip, knee and ankle ROM B: 5/5, except L hip IR 4-5 (pain in hip)  DERMATOMES: normal dull touch amongst B LE in dermatomal pattern  FLEXIBILITY: limited hamstrings B, 5* hip IR on L, 20* hip IR on R  PELVIS/SI SPECIAL TESTS: increased pain with R SLR  Lumbar special tests: pos spring test at L3 and L4  PALPATION: tenderness over L3 and L4  SPINAL SEGMENTAL CONCLUSIONS: hypomobility of lumbar spine    Assessment & Plan   CLINICAL IMPRESSIONS   Medical Diagnosis: Chronic pain of left knee; Chronic bilateral low back pain with right-sided sciatica    Treatment Diagnosis: chronic LBP; L knee pain   Impression/Assessment: Patient is a 63 year old male with LBP and L knee pain complaints.  The following significant findings have been identified: Pain, Decreased ROM/flexibility, Decreased joint mobility, Decreased strength, Impaired gait, Impaired muscle performance, Decreased activity tolerance, and Impaired posture. These impairments interfere with their ability to perform self care tasks, recreational activities, household chores, driving , household mobility, and community mobility as compared to previous level of function.     Clinical Decision Making (Complexity):   Clinical Presentation: Stable/Uncomplicated  Clinical Presentation Rationale: based on medical and personal factors listed in PT evaluation  Clinical Decision Making (Complexity): Low complexity    PLAN OF CARE  Treatment Interventions:  Modalities: Cryotherapy, E-stim, Hot Pack, Mechanical Traction, Ultrasound, TENS  Interventions: Gait Training, Manual Therapy, Neuromuscular Re-education, Therapeutic Activity, Therapeutic Exercise, Self-Care/Home Management    Long Term Goals   PT Goal 1  Goal Identifier: 1  Goal Description: Pt will be able to perform lumbar mobility ROM without increase in sx in order to decrease difficulty  with ADLs  Target Date: 08/10/23  PT Goal 2  Goal Identifier: 2  Goal Description: Pt will be able to demonstrate 5/5 global LE strength in order to decrease difficulty with ADLs  Target Date: 08/24/23  PT Goal 3  Goal Identifier: 3  Goal Description: Pt will be independent with HEP in order to self manage symptoms  Target Date: 08/09/23    Frequency of Treatment: 1x/week  Duration of Treatment: 8 weeks    Recommended Referrals to Other Professionals:  none  Education Assessment:   Learner/Method: Patient;Listening;Reading;Demonstration;Pictures/Video  Education Comments: pt demonstrated and verbalized good understanding of exercises    Risks and benefits of evaluation/treatment have been explained.   Patient/Family/caregiver agrees with Plan of Care.     Evaluation Time:   PT Eval, Low Complexity Minutes (28466): 30   Present: Not applicable    Signing Clinician: Dory Scott PT      [facility]                                                                                   OUTPATIENT PHYSICAL THERAPY    PLAN OF TREATMENT FOR OUTPATIENT REHABILITATION   Patient's Last Name, First Name, GABRIELLE SerranoKoko  SATINDER YOB: 1960   Provider's Name   [facility]   Medical Record No.  [id number]     Onset Date: 07/10/23 (date of order, chronic condition)  Start of Care Date: 07/13/23     Medical Diagnosis:  Chronic pain of left knee; Chronic bilateral low back pain with right-sided sciatica      PT Treatment Diagnosis:  chronic LBP; L knee pain Plan of Treatment  Frequency/Duration: 1x/week/ 8 weeks    Certification date from 07/13/23 to 08/09/23         See note for plan of treatment details and functional goals     Dory Scott PT       Please contact me with any questions or concerns.    Thank you for your referral,     Dory Scott PT, DPT  Physical Therapist  [facility]  [address] [place] [address]  [place] [address]  [phone]                          I CERTIFY THE NEED FOR THESE SERVICES FURNISHED UNDER        THIS PLAN OF TREATMENT AND WHILE UNDER MY CARE     (Physician attestation of this document indicates review and certification of the therapy plan).                Referring Provider:  Farooq Cárdenas      Initial Assessment  See Epic Evaluation- Start of Care Date: 07/13/23

## 2023-07-19 ASSESSMENT — ENCOUNTER SYMPTOMS
DIZZINESS: 0
SORE THROAT: 0
SHORTNESS OF BREATH: 0
MYALGIAS: 1
PARESTHESIAS: 0
FREQUENCY: 0
ARTHRALGIAS: 1
JOINT SWELLING: 1
EYE PAIN: 0
DYSURIA: 0
WEAKNESS: 0
COUGH: 0
HEADACHES: 0
CHILLS: 0
HEMATOCHEZIA: 0
PALPITATIONS: 0
NERVOUS/ANXIOUS: 0
HEMATURIA: 0
HEARTBURN: 0
FEVER: 0
CONSTIPATION: 0
NAUSEA: 0
ABDOMINAL PAIN: 0
DIARRHEA: 0

## 2023-07-19 ASSESSMENT — ACTIVITIES OF DAILY LIVING (ADL): CURRENT_FUNCTION: NO ASSISTANCE NEEDED

## 2023-07-20 ENCOUNTER — THERAPY VISIT (OUTPATIENT)
Dept: PHYSICAL THERAPY | Facility: CLINIC | Age: 63
End: 2023-07-20
Attending: PHYSICIAN ASSISTANT
Payer: COMMERCIAL

## 2023-07-20 ENCOUNTER — HOSPITAL ENCOUNTER (OUTPATIENT)
Dept: MRI IMAGING | Facility: CLINIC | Age: 63
Discharge: HOME | End: 2023-07-20
Attending: PHYSICIAN ASSISTANT
Payer: COMMERCIAL

## 2023-07-20 ENCOUNTER — OFFICE VISIT (OUTPATIENT)
Dept: FAMILY MEDICINE | Facility: CLINIC | Age: 63
End: 2023-07-20
Payer: COMMERCIAL

## 2023-07-20 VITALS
SYSTOLIC BLOOD PRESSURE: 110 MMHG | HEIGHT: 69 IN | OXYGEN SATURATION: 98 % | HEART RATE: 59 BPM | TEMPERATURE: 96.8 F | WEIGHT: 179.4 LBS | DIASTOLIC BLOOD PRESSURE: 76 MMHG | RESPIRATION RATE: 16 BRPM | BODY MASS INDEX: 26.57 KG/M2

## 2023-07-20 DIAGNOSIS — M25.562 CHRONIC PAIN OF LEFT KNEE: Primary | ICD-10-CM

## 2023-07-20 DIAGNOSIS — M54.41 CHRONIC BILATERAL LOW BACK PAIN WITH RIGHT-SIDED SCIATICA: ICD-10-CM

## 2023-07-20 DIAGNOSIS — G89.29 CHRONIC BILATERAL LOW BACK PAIN WITH RIGHT-SIDED SCIATICA: ICD-10-CM

## 2023-07-20 DIAGNOSIS — M54.2 CHRONIC NECK PAIN: ICD-10-CM

## 2023-07-20 DIAGNOSIS — R13.19 ESOPHAGEAL DYSPHAGIA: ICD-10-CM

## 2023-07-20 DIAGNOSIS — G89.29 CHRONIC NECK PAIN: ICD-10-CM

## 2023-07-20 DIAGNOSIS — G89.29 CHRONIC PAIN OF LEFT KNEE: Primary | ICD-10-CM

## 2023-07-20 DIAGNOSIS — Z00.00 ENCOUNTER FOR MEDICARE ANNUAL WELLNESS EXAM: Primary | ICD-10-CM

## 2023-07-20 PROCEDURE — 72148 MRI LUMBAR SPINE W/O DYE: CPT

## 2023-07-20 PROCEDURE — G0402 INITIAL PREVENTIVE EXAM: HCPCS | Performed by: PHYSICIAN ASSISTANT

## 2023-07-20 PROCEDURE — 72141 MRI NECK SPINE W/O DYE: CPT

## 2023-07-20 PROCEDURE — 99213 OFFICE O/P EST LOW 20 MIN: CPT | Mod: 25 | Performed by: PHYSICIAN ASSISTANT

## 2023-07-20 PROCEDURE — 97110 THERAPEUTIC EXERCISES: CPT | Mod: GP | Performed by: PHYSICAL MEDICINE & REHABILITATION

## 2023-07-20 PROCEDURE — 97140 MANUAL THERAPY 1/> REGIONS: CPT | Mod: GP | Performed by: PHYSICAL MEDICINE & REHABILITATION

## 2023-07-20 RX ORDER — HYDROCODONE BITARTRATE AND ACETAMINOPHEN 5; 325 MG/1; MG/1
TABLET ORAL
COMMUNITY
Start: 2023-06-13 | End: 2023-08-02

## 2023-07-20 RX ORDER — CHLORHEXIDINE GLUCONATE ORAL RINSE 1.2 MG/ML
SOLUTION DENTAL
COMMUNITY
Start: 2023-06-13 | End: 2023-08-02

## 2023-07-20 ASSESSMENT — ENCOUNTER SYMPTOMS
EYE PAIN: 0
MYALGIAS: 1
ABDOMINAL PAIN: 0
HEMATURIA: 0
DYSURIA: 0
ARTHRALGIAS: 1
COUGH: 0
PALPITATIONS: 0
NERVOUS/ANXIOUS: 0
WEAKNESS: 0
NAUSEA: 0
DIARRHEA: 0
CHILLS: 0
CONSTIPATION: 0
PARESTHESIAS: 0
DIZZINESS: 0
HEMATOCHEZIA: 0
JOINT SWELLING: 1
FEVER: 0
SHORTNESS OF BREATH: 0
SORE THROAT: 0
HEARTBURN: 0
HEADACHES: 0
FREQUENCY: 0

## 2023-07-20 ASSESSMENT — ACTIVITIES OF DAILY LIVING (ADL): CURRENT_FUNCTION: NO ASSISTANCE NEEDED

## 2023-07-20 ASSESSMENT — PAIN SCALES - GENERAL: PAINLEVEL: SEVERE PAIN (7)

## 2023-07-20 NOTE — PROGRESS NOTES
"SUBJECTIVE:   Koko is a 63 year old who presents for Preventive Visit.      7/20/2023     9:02 AM   Additional Questions   Roomed by Mirna MARVIN CMA     Are you in the first 12 months of your Medicare coverage?  Yes,  Visual Acuity:  Right Eye: 20/20  Left Eye: 20/20  Both Eyes: 20/20    Healthy Habits:     In general, how would you rate your overall health?  Excellent    Frequency of exercise:  None    Do you usually eat at least 4 servings of fruit and vegetables a day, include whole grains    & fiber and avoid regularly eating high fat or \"junk\" foods?  Yes    Taking medications regularly:  Yes    Medication side effects:  None    Ability to successfully perform activities of daily living:  No assistance needed    Home Safety:  No safety concerns identified    Hearing Impairment:  No hearing concerns    In the past 6 months, have you been bothered by leaking of urine?  No    In general, how would you rate your overall mental or emotional health?  Excellent    Additional concerns today:  Yes    Have you ever done Advance Care Planning? (For example, a Health Directive, POLST, or a discussion with a medical provider or your loved ones about your wishes): No, advance care planning information given to patient to review.  Patient plans to discuss their wishes with loved ones or provider.      Fall risk  Fallen 2 or more times in the past year?: Yes  Any fall with injury in the past year?: No    Cognitive Screening   1) Repeat 3 items (Leader, Season, Table)    2) Clock draw: NORMAL  3) 3 item recall: Recalls 1 object   Results: NORMAL clock, 1-2 items recalled: COGNITIVE IMPAIRMENT LESS LIKELY    Mini-CogTM Copyright [employer]. Licensed by the author for use in [facility]; reprinted with permission (dianna@.[facility]). All rights reserved.      Do you have sleep apnea, excessive snoring or daytime drowsiness?: no    Reviewed and updated as needed this visit by clinical staff   Tobacco  Allergies  Meds       "        Reviewed and updated as needed this visit by Provider                 Social History     Tobacco Use     Smoking status: Never     Smokeless tobacco: Never   Substance Use Topics     Alcohol use: Yes     Comment: rare         7/19/2023     2:39 PM   Alcohol Use   Prescreen: >3 drinks/day or >7 drinks/week? No          No data to display              Do you have a current opioid prescription? Yes   How severe is your pain on a scale from 1-10? 0/10          No data to display              Low Risk (0-3)  Moderate Risk (4-7)  High Risk (>8)     Do you use any other controlled substances or medications that are not prescribed by a provider? None      Trouble swallowing   Duration: years  Description (location/character/radiation): Says that it feels like things get stuck. Especially if they are more dry. Like rice and chicken   Intensity:  moderate  Accompanying signs and symptoms: no  History (similar episodes/previous evaluation): None  Precipitating or alleviating factors: None  Therapies tried and outcome: None     Current providers sharing in care for this patient include:   Patient Care Team:  Farooq Cárdenas PA-C as PCP - General (Family Medicine)    The following health maintenance items are reviewed in Epic and correct as of today:  Health Maintenance   Topic Date Due     ADVANCE CARE PLANNING  Never done     HIV SCREENING  Never done     HEPATITIS C SCREENING  Never done     ZOSTER IMMUNIZATION (1 of 2) Never done     COVID-19 Vaccine (3 - Moderna series) 05/19/2021     MEDICARE ANNUAL WELLNESS VISIT  01/20/2023     Pneumococcal Vaccine: 65+ Years (1 - PCV) Never done     AORTIC ANEURYSM SCREENING (SYSTEM ASSIGNED)  Never done     INFLUENZA VACCINE (1) 09/01/2023     COLORECTAL CANCER SCREENING  05/01/2024     ANNUAL REVIEW OF HM ORDERS  07/10/2024     FALL RISK ASSESSMENT  07/20/2024     LIPID  07/10/2028     DTAP/TDAP/TD IMMUNIZATION (3 - Td or Tdap) 02/23/2029     PHQ-2 (once per calendar year)   "Completed     IPV IMMUNIZATION  Aged Out     MENINGITIS IMMUNIZATION  Aged Out     Review of Systems   Constitutional:  Negative for chills and fever.   HENT:  Negative for congestion, ear pain, hearing loss and sore throat.    Eyes:  Negative for pain and visual disturbance.   Respiratory:  Negative for cough and shortness of breath.    Cardiovascular:  Positive for peripheral edema. Negative for chest pain and palpitations.   Gastrointestinal:  Negative for abdominal pain, constipation, diarrhea, heartburn, hematochezia and nausea.   Genitourinary:  Negative for dysuria, frequency, genital sores, hematuria, impotence, penile discharge and urgency.   Musculoskeletal:  Positive for arthralgias, joint swelling and myalgias.   Skin:  Negative for rash.   Neurological:  Negative for dizziness, weakness, headaches and paresthesias.   Psychiatric/Behavioral:  Negative for mood changes. The patient is not nervous/anxious.        OBJECTIVE:   There were no vitals taken for this visit. Estimated body mass index is 26.3 kg/m  as calculated from the following:    Height as of 7/10/23: 1.746 m (5' 8.75\").    Weight as of 7/10/23: 80.2 kg (176 lb 12.8 oz).  Physical Exam  GENERAL: healthy, alert and no distress  EYES: Eyes grossly normal to inspection, PERRL and conjunctivae and sclerae normal  HENT: nose and mouth without ulcers or lesions  NECK: no adenopathy, no asymmetry, masses, or scars and thyroid normal to palpation  RESP: lungs clear to auscultation - no rales, rhonchi or wheezes  CV: regular rate and rhythm, normal S1 S2, no S3 or S4, no murmur, click or rub, no peripheral edema and peripheral pulses strong  ABDOMEN: soft, nontender, no hepatosplenomegaly, no masses   MS: no gross musculoskeletal defects noted. Edema of dorsal R wrist  SKIN: no suspicious lesions or rashes  NEURO: mentation intact and speech normal  PSYCH: mentation appears normal, affect normal/bright    ASSESSMENT / PLAN:   Encounter for Medicare " "annual wellness exam  63 yr old here for Medicare Wellness exam. This is his first medicare wellness visit. Discussed preventative screenings which are updated below. Counseled on immunizations and healthy lifestyle. Follow-up in 1 year for repeat physical exam.     Esophageal dysphagia  Ongoing x 5-6 years, worsening over the last year. Describes feeling of food getting stuck in his throat, including pieces of meat, pills, and rice. Drinks water and soda to pass the food, can take up to 20 minutes. No regurgitation or vomiting. No heartburn. Normal exam today. Differential is broad including GERD, esophageal stricture, diverticulum, achalasia, hiatal hernia, rings, among others. Initial workup to include barium swallow and EGD. Recommend he eat slowly with small bite portions and drink plenty of fluids with meals. Will follow up with results of studies.   - Adult GI  Referral - Procedure Only; Future  - XR Esophagram w Upper GI; Future    Patient has been advised of split billing requirements and indicates understanding: Yes      COUNSELING:  Reviewed preventive health counseling, as reflected in patient instructions      BMI:   Estimated body mass index is 26.3 kg/m  as calculated from the following:    Height as of 7/10/23: 1.746 m (5' 8.75\").    Weight as of 7/10/23: 80.2 kg (176 lb 12.8 oz).   Weight management plan: Discussed healthy diet and exercise guidelines      He reports that he has never smoked. He has never used smokeless tobacco.      Appropriate preventive services were discussed with this patient, including applicable screening as appropriate for cardiovascular disease, diabetes, osteopenia/osteoporosis, and glaucoma.  As appropriate for age/gender, discussed screening for colorectal cancer, prostate cancer, breast cancer, and cervical cancer. Checklist reviewing preventive services available has been given to the patient.    Reviewed patients plan of care and provided an AVS. The Basic " Care Plan (routine screening as documented in Health Maintenance) for Koko meets the Care Plan requirement. This Care Plan has been established and reviewed with the Patient.          JEANETTE Colin-Student    Physician Attestation   I, Farooq Cárdenas PA-C, was present with the medical/TARUN student who participated in the service and in the documentation of the note.  I have verified the history and personally performed the physical exam and medical decision making.  I agree with the assessment and plan of care as documented in the note.      Farooq Cárdenas PA-C   [facility]    Identified Health Risks:  I have reviewed Opioid Use Disorder and Substance Use Disorder risk factors and made any needed referrals.

## 2023-07-20 NOTE — PATIENT INSTRUCTIONS
Patient Education   Personalized Prevention Plan  You are due for the preventive services outlined below.  Your care team is available to assist you in scheduling these services.  If you have already completed any of these items, please share that information with your care team to update in your medical record.  Health Maintenance Due   Topic Date Due     HIV Screening  Never done     Hepatitis C Screening  Never done     Zoster (Shingles) Vaccine (1 of 2) Never done     COVID-19 Vaccine (3 - Moderna series) 05/19/2021     Pneumococcal Vaccine (1 - PCV) Never done     AORTIC ANEURYSM SCREENING (SYSTEM ASSIGNED)  Never done       Exercise for a Healthier Heart  You may wonder how you can improve the health of your heart. If you re thinking about exercise, you re on the right track. You don t need to become an athlete. But you do need a certain amount of brisk exercise to help strengthen your heart. If you have been diagnosed with a heart condition, your healthcare provider may advise exercise to help your condition. To help make exercise a habit, choose safe, fun activities.      Exercise with a friend. When activity is fun, you're more likely to stick with it.     Before you start  Check with your healthcare provider before starting an exercise program. This is especially important if you haven't been active for a while. It's also important if you have a long-term (chronic) health problem such as heart disease, diabetes, or obesity. Also check with your provider if you're at high risk for having these problems.   Why exercise?  Exercising regularly offers many healthy rewards. It can help you do all of these:     Improve your blood cholesterol level to help prevent further heart trouble.    Lower your blood pressure to help prevent a stroke or heart attack.    Control diabetes or reduce your risk of getting this disease.    Improve your heart and lung function.    Reach and stay at a healthy weight.    Make your  muscles stronger so you can stay active.    Prevent falls and fractures by slowing the loss of bone mass (osteoporosis).    Manage stress better.    Improve your sense of self and your body image.  Exercise tips      Ease into your routine. Set small goals. Then build on them. Talk with your healthcare provider first before starting an exercise routine if you're not sure what your activity level should be.    Exercise on most days. Aim for a total of at least 150 minutes (2 hours and 30 minutes) or more of moderate-intensity aerobic activity each week. You could also do 75 minutes (1 hour and 15 minutes) or more of vigorous-intensity aerobic activity each week. Or try for a combination of both. Moderate activity means that you breathe heavier and your heart rate increases, but you can still talk. Think about doing at least 30 minutes of moderate exercise, 5 times a week. It's OK to work up to the 30-minute period over time. Examples of moderate-intensity activity are brisk walking, gardening, and water aerobics.    Step up your daily activity level.  Along with your exercise program, try being more active the whole day. Walk instead of drive. Or park further away so that you take more steps each day. Do more household tasks or yard work. You may not be able to meet the advised amount of physical activity. But doing some moderate- or vigorous-intensity aerobic activity can help reduce your risk for heart disease. Your healthcare provider can help you figure out what is best for you.    Choose 1 or more activities you enjoy.  Walking is one of the easiest things you can do. You can also try swimming, riding a bike, dancing, or taking an exercise class.    Call 911  Call 911 right away if any of these occur:     Chest pain that doesn't go away quickly with rest    New burning, tightness, pressure, or heaviness in your chest, neck, shoulders, back, or arms    Abnormal or severe shortness of breath    A very fast or  "irregular heartbeat (palpitations)    Fainting  When to call your healthcare provider  Call your healthcare provider if you have any of these:     Dizziness or lightheadedness    Mild shortness of breath or chest pain    Increased or new joint or muscle pain    Lidia last reviewed this educational content on 7/1/2022 2000-2023 [employer]. All rights reserved. This information is not intended as a substitute for professional medical care. Always follow your healthcare professional's instructions.        Learning About Being Physically Active  What is physical activity?     Being physically active means doing any kind of activity that gets your body moving.  The types of physical activity that can help you get fit and stay healthy include:    Aerobic or \"cardio\" activities. These make your heart beat faster and make you breathe harder, such as brisk walking, riding a bike, or running. They strengthen your heart and lungs and build up your endurance.    Strength training activities. These make your muscles work against, or \"resist,\" something. Examples include lifting weights or doing push-ups. These activities help tone and strengthen your muscles and bones.    Stretches. These let you move your joints and muscles through their full range of motion. Stretching helps you be more flexible.  Reaching a balance between these three types of physical activity is important because each one contributes to your overall fitness.  What are the benefits of being active?  Being active is one of the best things you can do for your health. It helps you to:    Feel stronger and have more energy to do all the things you like to do.    Focus better at school or work.    Feel, think, and sleep better.    Reach and stay at a healthy weight.    Lose fat and build lean muscle.    Lower your risk for serious health problems, including diabetes, heart attack, high blood pressure, and some cancers.    Keep your heart, " "lungs, bones, muscles, and joints strong and healthy.  How can you make being active part of your life?  Start slowly. Make it your long-term goal to get at least 30 minutes of exercise on most days of the week. Walking is a good choice. You also may want to do other activities, such as running, swimming, cycling, or playing tennis or team sports.  Pick activities that you like--ones that make your heart beat faster, your muscles stronger, and your muscles and joints more flexible. If you find more than one thing you like doing, do them all. You don't have to do the same thing every day.  Get your heart pumping every day. Any activity that makes your heart beat faster and keeps it at that rate for a while counts.  Here are some great ways to get your heart beating faster:    Go for a brisk walk, run, or bike ride.    Go for a hike or swim.    Go in-line skating.    Play a game of touch football, basketball, or soccer.    Ride a bike.    Play tennis or racquetball.    Climb stairs.  Even some household chores can be aerobic--just do them at a faster pace. Vacuuming, raking or mowing the lawn, sweeping the garage, and washing and waxing the car all can help get your heart rate up.  Strengthen your muscles during the week. You don't have to lift heavy weights or grow big, bulky muscles to get stronger. Doing a few simple activities that make your muscles work against, or \"resist,\" something can help you get stronger.  For example, you can:    Do push-ups or sit-ups, which use your own body weight as resistance.    Lift weights or dumbbells or use stretch bands at home or in a gym or community center.  Stretch your muscles often. Stretching will help you as you become more active. It can help you stay flexible, loosen tight muscles, and avoid injury. It can also help improve your balance and posture and can be a great way to relax.  Be sure to stretch the muscles you'll be using when you work out. It's best to warm your " "muscles slightly before you stretch them. Walk or do some other light aerobic activity for a few minutes, and then start stretching.  When you stretch your muscles:    Do it slowly. Stretching is not about going fast or making sudden movements.    Don't push or bounce during a stretch.    Hold each stretch for at least 15 to 30 seconds, if you can. You should feel a stretch in the muscle, but not pain.    Breathe out as you do the stretch. Then breathe in as you hold the stretch. Don't hold your breath.  If you're worried about how more activity might affect your health, have a checkup before you start. Follow any special advice your doctor gives you for getting a smart start.  Where can you learn more?  Go to https://www.[employer].net/patiented  Enter W332 in the search box to learn more about \"Learning About Being Physically Active.\"  Current as of: October 10, 2022               Content Version: 13.7    7521-8939 [employer].   Care instructions adapted under license by your healthcare professional. If you have questions about a medical condition or this instruction, always ask your healthcare professional. [employer] disclaims any warranty or liability for your use of this information.         "

## 2023-07-26 ENCOUNTER — OFFICE VISIT (OUTPATIENT)
Dept: ORTHOPEDICS | Facility: CLINIC | Age: 63
End: 2023-07-26
Attending: PHYSICIAN ASSISTANT
Payer: COMMERCIAL

## 2023-07-26 ENCOUNTER — ANCILLARY PROCEDURE (OUTPATIENT)
Dept: GENERAL RADIOLOGY | Facility: CLINIC | Age: 63
End: 2023-07-26
Attending: PEDIATRICS
Payer: COMMERCIAL

## 2023-07-26 VITALS
SYSTOLIC BLOOD PRESSURE: 124 MMHG | WEIGHT: 179 LBS | DIASTOLIC BLOOD PRESSURE: 74 MMHG | HEART RATE: 62 BPM | BODY MASS INDEX: 26.63 KG/M2

## 2023-07-26 DIAGNOSIS — G89.29 CHRONIC PAIN OF LEFT KNEE: ICD-10-CM

## 2023-07-26 DIAGNOSIS — M25.562 CHRONIC PAIN OF LEFT KNEE: ICD-10-CM

## 2023-07-26 DIAGNOSIS — M17.12 ARTHRITIS OF LEFT KNEE: ICD-10-CM

## 2023-07-26 DIAGNOSIS — S66.801A INJURY OF FLEXOR TENDON OF RIGHT HAND, INITIAL ENCOUNTER: Primary | ICD-10-CM

## 2023-07-26 PROCEDURE — 73560 X-RAY EXAM OF KNEE 1 OR 2: CPT | Mod: TC | Performed by: RADIOLOGY

## 2023-07-26 PROCEDURE — 73140 X-RAY EXAM OF FINGER(S): CPT | Mod: TC | Performed by: RADIOLOGY

## 2023-07-26 PROCEDURE — 99204 OFFICE O/P NEW MOD 45 MIN: CPT | Performed by: PEDIATRICS

## 2023-07-26 NOTE — Clinical Note
Fernando Connors,  This patient thought he was seeing me for an  brace but these are fitted in PT. His PCP put in an  brace referral 7/10 - have you been able to do this in PT? I put in an additional order if needed. Let the patient know if he needs to be seen by another PT here in [place]. Cathi

## 2023-07-26 NOTE — LETTER
7/26/2023         RE: Koko Serrano  [address] [place] [id number]        Dear Colleague,    Thank you for referring your patient, Koko Serrano, to the [facility] [place]. Please see a copy of my visit note below.    ASSESSMENT & PLAN    Koko was seen today for pain, pain and instability .    Diagnoses and all orders for this visit:    Injury of flexor tendon of right hand, initial encounter  -     Orthopedic  Referral; Future    Chronic pain of left knee  -     Orthopedic  Referral  -     XR Finger Right G/E 2 Views; Future  -     Cancel: XR Knee Standing AP [place] Left; Future  -     Physical Therapy Referral; Future    Arthritis of left knee  -     Physical Therapy Referral; Future      This issue is chronic and Unchanged.    Right thumb flexion tendon injury, this is chronic. Discussed bracing and follow up with hand surgery.    Discussed nature of degenerative arthrosis of the knee. Discussed symptom treatment with over-the-counter medications, ice or heat, topical treatments, and rest if needed. Discussed use of sleeve or wrap for comfort. Discussed benefits of exercise and physical therapy. Discussed injection therapy. Also briefly discussed future consideration of referral to orthopedic surgery for further evaluation and discussion of arthroplasty.    Plan:  - Today's Plan of Care:  Referral to an Orthopedic Surgeon - Hand  Rehab: Physical Therapy: [facility] - [phone]  -  brace trial    Discussed activity considerations and other supportive care including Ice/Heat, OTC and other topical medications as needed.    -We also discussed other future treatment options:  Referral to Orthopedic Surgery  Consideration of injections    Follow Up: as needed    Concerning signs and symptoms were reviewed and all questions were answered at this time.    Cathi Moctezuma MD CAQ  Sports Medicine Physician  [facility]  Orthopedics    -----  Chief Complaint   Patient presents with     Left Knee - Pain     Right Thumb - Pain, instability        SUBJECTIVE  Koko Serrano is a/an 63 year old male who is seen in consultation at the request of  Farooq Cárdenas PA-C for evaluation of left knee pain. Is interested in unloading brace for knee. Would also like to discuss right thumb.    The patient is seen by themselves.    1) Left knee  Onset: many years(s) ago. Reports insidious onset without acute precipitating event.  Many injuries over the years with horses and motorcycles  Location of Pain: left knee; posterior, medial joint line   Worsened by: walking, stairs, sleeping, in/out of a car  Better with: heating, ibuprofen   Treatments tried: ice, heat, ibuprofen, home exercises, physical therapy (few visits for Lumbar/low back), and previous imaging (xray 7/10/23)  Associated symptoms: swelling, weakness of left knee, and feeling of instability, clicking   - is currently in physical therapy    2) Right Thumb; PIP--Interested in next steps.   Onset; Many years ago  Location of pain; PIP on the right thumb, injury described as flexor tendon injury  Worse with; any use of the hand, pushing on the thumb, grabbing  Better with; unknown  Treatments tried, none  Associated symptoms; Swelling, weakness, instability, hyperflexion, hyperflexibility    Orthopedic/Surgical history: YES - Date: many injuries over the years  Social History/Occupation: retired     REVIEW OF SYSTEMS:  Review of Systems    OBJECTIVE:  /74   Pulse 62   Wt 81.2 kg (179 lb)   BMI 26.63 kg/m     General: healthy, alert and in no distress  Skin: no suspicious lesions or rash.  CV: distal perfusion intact  Resp: normal respiratory effort without conversational dyspnea   Psych: normal mood and affect  Gait: NORMAL  Neuro: Normal light sensory exam of lower extremity    Bilateral Wrist and Hand exam    Inspection:       No swelling, bruising  or deformity bilateral  - hyperextension of thumb IP joint    Tender:       none    Non Tender:       Remainder of the Wrist and Hand bilateral    ROM/Strength       Unable to flex thumb at IP joint on the right, right wrist decreased ROM, otherwise normal strengt    Neurovascular:       2+ radial pulses bilaterally with brisk capillary refill and      normal sensation to light touch in the radial, median and ulnar nerve distributions    Bilateral Knee exam    Inspection:      mild effusion and swelling left knee    Patella:      Crepitus noted in the patellofemoral joint left    Tender:      medial joint line left    Non Tender:      remainder of knee area left    Knee ROM:      Full active and passive ROM with flexion and extension bilateral    Hip ROM:     Full active and passive ROM bilateral    Strength:      5/5 with knee extension bilateral    Special Tests:     neg (-) Lila left       neg (-) anterior drawer left       neg (-) posterior drawer left       neg (-) varus at 0 deg and 30 deg left       neg (-) valgus at 0 deg and 30 deg left    Gait:      normal    Neurovascular:      2+ peripheral pulses bilaterally and brisk capillary refill       sensation grossly intact      RADIOLOGY:  Final results and radiologist's interpretation, available in the [facility] health record.  Images were reviewed with the patient in the office today.  My personal interpretation of the performed imaging:   3 XR views of right thumb reviewed: no acute bony abnormality, degenerative change seen into right wrist, severe  - will follow official read    [facility] knee XR views today reviewed with XRs from 7/10/2023 - no acute bony abnormality, mild medial compartment degenerative change  - will follow official read    Reviewed PT notes and PCP notes  Review of the result(s) of each unique test - XRs             Again, thank you for allowing me to participate in the care of your patient.        Sincerely,        Cathi Moctezuma MD

## 2023-07-26 NOTE — PROGRESS NOTES
ASSESSMENT & PLAN    Koko was seen today for pain, pain and instability .    Diagnoses and all orders for this visit:    Injury of flexor tendon of right hand, initial encounter  -     Orthopedic  Referral; Future    Chronic pain of left knee  -     Orthopedic  Referral  -     XR Finger Right G/E 2 Views; Future  -     Cancel: XR Knee Standing AP [place] Bilat Lat Left; Future  -     Physical Therapy Referral; Future    Arthritis of left knee  -     Physical Therapy Referral; Future      This issue is chronic and Unchanged.    Right thumb flexion tendon injury, this is chronic. Discussed bracing and follow up with hand surgery.    Discussed nature of degenerative arthrosis of the knee. Discussed symptom treatment with over-the-counter medications, ice or heat, topical treatments, and rest if needed. Discussed use of sleeve or wrap for comfort. Discussed benefits of exercise and physical therapy. Discussed injection therapy. Also briefly discussed future consideration of referral to orthopedic surgery for further evaluation and discussion of arthroplasty.    Plan:  - Today's Plan of Care:  Referral to an Orthopedic Surgeon - Hand  Rehab: [facility] [phone]  -  brace trial    Discussed activity considerations and other supportive care including Ice/Heat, OTC and other topical medications as needed.    -We also discussed other future treatment options:  Referral to Orthopedic Surgery  Consideration of injections    Follow Up: as needed    Concerning signs and symptoms were reviewed and all questions were answered at this time.    Cathi Moctezuma MD [facility]  Sports Medicine Physician  [facility]    -----  Chief Complaint   Patient presents with    Left Knee - Pain    Right Thumb - Pain, instability        SUBJECTIVE  Koko Serrano is a/an 63 year old male who is seen in consultation at the request of  Farooq Cárdenas PA-C for evaluation  of left knee pain. Is interested in unloading brace for knee. Would also like to discuss right thumb.    The patient is seen by themselves.    1) Left knee  Onset: many years(s) ago. Reports insidious onset without acute precipitating event.  Many injuries over the years with horses and motorcycles  Location of Pain: left knee; posterior, medial joint line   Worsened by: walking, stairs, sleeping, in/out of a car  Better with: heating, ibuprofen   Treatments tried: ice, heat, ibuprofen, home exercises, physical therapy (few visits for Lumbar/low back), and previous imaging (xray 7/10/23)  Associated symptoms: swelling, weakness of left knee, and feeling of instability, clicking   - is currently in physical therapy    2) Right Thumb; PIP--Interested in next steps.   Onset; Many years ago  Location of pain; PIP on the right thumb, injury described as flexor tendon injury  Worse with; any use of the hand, pushing on the thumb, grabbing  Better with; unknown  Treatments tried, none  Associated symptoms; Swelling, weakness, instability, hyperflexion, hyperflexibility    Orthopedic/Surgical history: YES - Date: many injuries over the years  Social History/Occupation: retired     REVIEW OF SYSTEMS:  Review of Systems    OBJECTIVE:  /74   Pulse 62   Wt 81.2 kg (179 lb)   BMI 26.63 kg/m     General: healthy, alert and in no distress  Skin: no suspicious lesions or rash.  CV: distal perfusion intact  Resp: normal respiratory effort without conversational dyspnea   Psych: normal mood and affect  Gait: NORMAL  Neuro: Normal light sensory exam of lower extremity    Bilateral Wrist and Hand exam    Inspection:       No swelling, bruising or deformity bilateral  - hyperextension of thumb IP joint    Tender:       none    Non Tender:       Remainder of the Wrist and Hand bilateral    ROM/Strength       Unable to flex thumb at IP joint on the right, right wrist decreased ROM, otherwise normal  strengt    Neurovascular:       2+ radial pulses bilaterally with brisk capillary refill and      normal sensation to light touch in the radial, median and ulnar nerve distributions    Bilateral Knee exam    Inspection:      mild effusion and swelling left knee    Patella:      Crepitus noted in the patellofemoral joint left    Tender:      medial joint line left    Non Tender:      remainder of knee area left    Knee ROM:      Full active and passive ROM with flexion and extension bilateral    Hip ROM:     Full active and passive ROM bilateral    Strength:      5/5 with knee extension bilateral    Special Tests:     neg (-) Lila left       neg (-) anterior drawer left       neg (-) posterior drawer left       neg (-) varus at 0 deg and 30 deg left       neg (-) valgus at 0 deg and 30 deg left    Gait:      normal    Neurovascular:      2+ peripheral pulses bilaterally and brisk capillary refill       sensation grossly intact      RADIOLOGY:  Final results and radiologist's interpretation, available in the [facility] health record.  Images were reviewed with the patient in the office today.  My personal interpretation of the performed imaging:   3 XR views of right thumb reviewed: no acute bony abnormality, degenerative change seen into right wrist, severe  - will follow official read    [place] knee XR views today reviewed with XRs from 7/10/2023 - no acute bony abnormality, mild medial compartment degenerative change  - will follow official read    Reviewed PT notes and PCP notes  Review of the result(s) of each unique test - XRs

## 2023-07-26 NOTE — PATIENT INSTRUCTIONS
Right thumb flexion tendon injury, this is chronic. Discussed bracing and follow up with hand surgery.    Discussed nature of degenerative arthrosis of the knee. Discussed symptom treatment with over-the-counter medications, ice or heat, topical treatments, and rest if needed. Discussed use of sleeve or wrap for comfort. Discussed benefits of exercise and physical therapy. Discussed injection therapy. Also briefly discussed future consideration of referral to orthopedic surgery for further evaluation and discussion of arthroplasty.    Plan:  - Today's Plan of Care:  Referral to an Orthopedic Surgeon - Hand  Rehab: [facility] - [phone]  -  brace trial    Discussed activity considerations and other supportive care including Ice/Heat, OTC and other topical medications as needed.    -We also discussed other future treatment options:  Referral to Orthopedic Surgery  Consideration of injections    Follow Up: as needed    If you have any further questions for your physician or physician s care team you can call [phone] and use option 3 to leave a voice message.

## 2023-08-07 ENCOUNTER — TRANSFERRED RECORDS (OUTPATIENT)
Dept: HEALTH INFORMATION MANAGEMENT | Facility: CLINIC | Age: 63
End: 2023-08-07
Payer: COMMERCIAL

## 2023-08-07 ENCOUNTER — ANESTHESIA EVENT (OUTPATIENT)
Dept: GASTROENTEROLOGY | Facility: CLINIC | Age: 63
End: 2023-08-07
Payer: COMMERCIAL

## 2023-08-07 NOTE — ANESTHESIA PREPROCEDURE EVALUATION
Anesthesia Pre-Procedure Evaluation    Patient: Koko Serrano   MRN: [id number] : 1960        Procedure : Procedure(s):  Esophagoscopy, gastroscopy, duodenoscopy (EGD), combined          Past Medical History:   Diagnosis Date     Injury, other and unspecified, elbow, forearm, and wrist      Unspecified disorder of lipoid metabolism       Past Surgical History:   Procedure Laterality Date     COLONOSCOPY       COLONOSCOPY  2014    Procedure: COLONOSCOPY;  Surgeon: Wing Hyatt MD;  Location: [facility]     HC REMOVE TONSILS/ADENOIDS,10+ Y/O      T & A 10+y.o.     SURGICAL HISTORY OF -       Hernia Repair x2     SURGICAL HISTORY OF -       Thyroglassal Cyst Surgery     SURGICAL HISTORY OF -       Cystourethroscopy, right ureteral reimplantation on 1994     Z APPENDECTOMY        Allergies   Allergen Reactions     Nka [No Known Allergies]       Social History     Tobacco Use     Smoking status: Never     Smokeless tobacco: Never   Substance Use Topics     Alcohol use: Yes     Comment: rare      Wt Readings from Last 1 Encounters:   23 81.2 kg (179 lb)        Anesthesia Evaluation   Pt has had prior anesthetic.         ROS/MED HX  ENT/Pulmonary:  - neg pulmonary ROS     Neurologic:  - neg neurologic ROS     Cardiovascular:     (+) Dyslipidemia - -   -  - -                                      METS/Exercise Tolerance:     Hematologic:       Musculoskeletal:       GI/Hepatic: Comment: Esophageal dysphagia      Renal/Genitourinary:     (+)        BPH,      Endo:  - neg endo ROS     Psychiatric/Substance Use:  - neg psychiatric ROS     Infectious Disease:  - neg infectious disease ROS     Malignancy:       Other:      (+)  , H/O Chronic Pain,         Physical Exam    Airway  airway exam normal      Mallampati: II   TM distance: > 3 FB   Neck ROM: full   Mouth opening: > 3 cm    Respiratory Devices and Support         Dental       (+) Completely normal teeth      Cardiovascular   cardiovascular  exam normal          Pulmonary   pulmonary exam normal        breath sounds clear to auscultation       OUTSIDE LABS:  CBC:   Lab Results   Component Value Date    WBC 6.6 12/20/2016    WBC 4.2 09/24/2015    HGB 14.7 12/20/2016    HGB 15.0 09/24/2015    HCT 44.1 12/20/2016    HCT 45.3 09/24/2015     12/20/2016     09/24/2015     BMP:   Lab Results   Component Value Date     07/10/2023     09/24/2015    POTASSIUM 4.6 07/10/2023    POTASSIUM 4.1 09/24/2015    CHLORIDE 106 07/10/2023    CHLORIDE 105 09/24/2015    CO2 26 07/10/2023    CO2 27 09/24/2015    BUN 16.8 07/10/2023    BUN 18 09/24/2015    CR 1.04 07/10/2023    CR 0.98 09/24/2015    GLC 81 07/10/2023    GLC 94 09/24/2015     COAGS: No results found for: PTT, INR, FIBR  POC: No results found for: BGM, HCG, HCGS  HEPATIC:   Lab Results   Component Value Date    ALBUMIN 4.7 07/10/2023    PROTTOTAL 6.9 07/10/2023    ALT 20 07/10/2023    AST 24 07/10/2023    ALKPHOS 70 07/10/2023    BILITOTAL 0.3 07/10/2023     OTHER:   Lab Results   Component Value Date    GLENN 9.0 07/10/2023    LIPASE 135 09/21/2005       Anesthesia Plan    ASA Status:  2    NPO Status:  NPO Appropriate    Anesthesia Type: General.     - Airway: Native airway   Induction: Intravenous.   Maintenance: TIVA.        Consents    Anesthesia Plan(s) and associated risks, benefits, and realistic alternatives discussed. Questions answered and patient/representative(s) expressed understanding.     - Discussed: Risks, Benefits and Alternatives for BOTH SEDATION and the PROCEDURE were discussed     - Discussed with:  Patient            Postoperative Care            Comments:              CHRISTIAN Galaviz CRNA

## 2023-08-08 ENCOUNTER — HOSPITAL ENCOUNTER (OUTPATIENT)
Facility: CLINIC | Age: 63
Discharge: HOME | End: 2023-08-08
Attending: SURGERY | Admitting: SURGERY
Payer: COMMERCIAL

## 2023-08-08 ENCOUNTER — ANESTHESIA (OUTPATIENT)
Dept: GASTROENTEROLOGY | Facility: CLINIC | Age: 63
End: 2023-08-08
Payer: COMMERCIAL

## 2023-08-08 VITALS
BODY MASS INDEX: 26.51 KG/M2 | HEART RATE: 68 BPM | WEIGHT: 179 LBS | DIASTOLIC BLOOD PRESSURE: 71 MMHG | SYSTOLIC BLOOD PRESSURE: 105 MMHG | OXYGEN SATURATION: 99 % | HEIGHT: 69 IN | RESPIRATION RATE: 16 BRPM | TEMPERATURE: 97.7 F

## 2023-08-08 DIAGNOSIS — K21.00 GASTROESOPHAGEAL REFLUX DISEASE WITH ESOPHAGITIS, UNSPECIFIED WHETHER HEMORRHAGE: Primary | ICD-10-CM

## 2023-08-08 LAB — UPPER GI ENDOSCOPY: NORMAL

## 2023-08-08 PROCEDURE — 43249 ESOPH EGD DILATION <30 MM: CPT | Performed by: SURGERY

## 2023-08-08 PROCEDURE — 370N000017 HC ANESTHESIA TECHNICAL FEE, PER MIN: Performed by: SURGERY

## 2023-08-08 PROCEDURE — 88305 TISSUE EXAM BY PATHOLOGIST: CPT | Mod: TC | Performed by: SURGERY

## 2023-08-08 PROCEDURE — 258N000003 HC RX IP 258 OP 636: Performed by: SURGERY

## 2023-08-08 PROCEDURE — 250N000009 HC RX 250: Performed by: SURGERY

## 2023-08-08 PROCEDURE — 43239 EGD BIOPSY SINGLE/MULTIPLE: CPT | Mod: XS | Performed by: SURGERY

## 2023-08-08 PROCEDURE — 250N000011 HC RX IP 250 OP 636: Performed by: NURSE ANESTHETIST, CERTIFIED REGISTERED

## 2023-08-08 PROCEDURE — 250N000009 HC RX 250: Performed by: NURSE ANESTHETIST, CERTIFIED REGISTERED

## 2023-08-08 RX ORDER — ONDANSETRON 2 MG/ML
4 INJECTION INTRAMUSCULAR; INTRAVENOUS EVERY 6 HOURS PRN
Status: DISCONTINUED | OUTPATIENT
Start: 2023-08-08 | End: 2023-08-08 | Stop reason: HOSPADM

## 2023-08-08 RX ORDER — OMEPRAZOLE 20 MG/1
20 TABLET, DELAYED RELEASE ORAL DAILY
Qty: 30 TABLET | Refills: 11 | Status: SHIPPED | OUTPATIENT
Start: 2023-08-08 | End: 2023-10-26

## 2023-08-08 RX ORDER — SODIUM CHLORIDE, SODIUM LACTATE, POTASSIUM CHLORIDE, CALCIUM CHLORIDE 600; 310; 30; 20 MG/100ML; MG/100ML; MG/100ML; MG/100ML
INJECTION, SOLUTION INTRAVENOUS CONTINUOUS
Status: DISCONTINUED | OUTPATIENT
Start: 2023-08-08 | End: 2023-08-08 | Stop reason: HOSPADM

## 2023-08-08 RX ORDER — PROPOFOL 10 MG/ML
INJECTION, EMULSION INTRAVENOUS CONTINUOUS PRN
Status: DISCONTINUED | OUTPATIENT
Start: 2023-08-08 | End: 2023-08-08

## 2023-08-08 RX ORDER — ONDANSETRON 4 MG/1
4 TABLET, ORALLY DISINTEGRATING ORAL EVERY 6 HOURS PRN
Status: DISCONTINUED | OUTPATIENT
Start: 2023-08-08 | End: 2023-08-08 | Stop reason: HOSPADM

## 2023-08-08 RX ORDER — NALOXONE HYDROCHLORIDE 0.4 MG/ML
0.4 INJECTION, SOLUTION INTRAMUSCULAR; INTRAVENOUS; SUBCUTANEOUS
Status: DISCONTINUED | OUTPATIENT
Start: 2023-08-08 | End: 2023-08-08 | Stop reason: HOSPADM

## 2023-08-08 RX ORDER — FLUMAZENIL 0.1 MG/ML
0.2 INJECTION, SOLUTION INTRAVENOUS
Status: DISCONTINUED | OUTPATIENT
Start: 2023-08-08 | End: 2023-08-08 | Stop reason: HOSPADM

## 2023-08-08 RX ORDER — LIDOCAINE HYDROCHLORIDE 20 MG/ML
INJECTION, SOLUTION INFILTRATION; PERINEURAL PRN
Status: DISCONTINUED | OUTPATIENT
Start: 2023-08-08 | End: 2023-08-08

## 2023-08-08 RX ORDER — LIDOCAINE 40 MG/G
CREAM TOPICAL
Status: DISCONTINUED | OUTPATIENT
Start: 2023-08-08 | End: 2023-08-08 | Stop reason: HOSPADM

## 2023-08-08 RX ORDER — PROCHLORPERAZINE MALEATE 5 MG
5 TABLET ORAL EVERY 6 HOURS PRN
Status: DISCONTINUED | OUTPATIENT
Start: 2023-08-08 | End: 2023-08-08 | Stop reason: HOSPADM

## 2023-08-08 RX ORDER — NALOXONE HYDROCHLORIDE 0.4 MG/ML
0.2 INJECTION, SOLUTION INTRAMUSCULAR; INTRAVENOUS; SUBCUTANEOUS
Status: DISCONTINUED | OUTPATIENT
Start: 2023-08-08 | End: 2023-08-08 | Stop reason: HOSPADM

## 2023-08-08 RX ORDER — GLYCOPYRROLATE 0.2 MG/ML
INJECTION, SOLUTION INTRAMUSCULAR; INTRAVENOUS PRN
Status: DISCONTINUED | OUTPATIENT
Start: 2023-08-08 | End: 2023-08-08

## 2023-08-08 RX ADMIN — GLYCOPYRROLATE 0.2 MG: 0.2 INJECTION, SOLUTION INTRAMUSCULAR; INTRAVENOUS at 08:33

## 2023-08-08 RX ADMIN — SODIUM CHLORIDE, POTASSIUM CHLORIDE, SODIUM LACTATE AND CALCIUM CHLORIDE: 600; 310; 30; 20 INJECTION, SOLUTION INTRAVENOUS at 08:17

## 2023-08-08 RX ADMIN — LIDOCAINE HYDROCHLORIDE 50 MG: 20 INJECTION, SOLUTION INFILTRATION; PERINEURAL at 08:33

## 2023-08-08 RX ADMIN — LIDOCAINE HYDROCHLORIDE 0.1 ML: 10 INJECTION, SOLUTION EPIDURAL; INFILTRATION; INTRACAUDAL; PERINEURAL at 08:17

## 2023-08-08 RX ADMIN — TOPICAL ANESTHETIC 1 SPRAY: 200 SPRAY DENTAL; PERIODONTAL at 08:33

## 2023-08-08 RX ADMIN — PROPOFOL 200 MCG/KG/MIN: 10 INJECTION, EMULSION INTRAVENOUS at 08:33

## 2023-08-08 ASSESSMENT — ACTIVITIES OF DAILY LIVING (ADL): ADLS_ACUITY_SCORE: 35

## 2023-08-08 NOTE — PROGRESS NOTES
I have ordered you some Prilosec to help heal your esophagus.   But you do have a 2-3 cm hiatal hernia that just means that your stomach is above the diaphragm, instead of your last part of your esophagus being below the diaphragm.   Lets see how you do on the Prilosec first.  If that makes a big difference then will watch it but if not you may benefit from a surgical procedure to help with reflux of acid into the esophagus that is causing it to scar.    My Partner Dr. Rogerio Hankins does these surgeries so you can call and get an appointment from him to see what he can offer.   Please call [phone], for  [facility] to make an appointment with him.

## 2023-08-08 NOTE — ANESTHESIA CARE TRANSFER NOTE
Patient: Koko Serrano    Procedure: Procedure(s):  Esophagoscopy, gastroscopy, duodenoscopy (EGD), combined       Diagnosis: Esophageal dysphagia [R13.19]  Diagnosis Additional Information: No value filed.    Anesthesia Type:   No value filed.     Note:    Oropharynx: spontaneously breathing  Level of Consciousness: drowsy  Oxygen Supplementation: room air    Independent Airway: airway patency satisfactory and stable  Dentition: dentition unchanged  Vital Signs Stable: post-procedure vital signs reviewed and stable  Report to RN Given: handoff report given  Patient transferred to: Phase II    Handoff Report: Identifed the Patient, Identified the Reponsible Provider, Reviewed the pertinent medical history, Discussed the surgical course, Reviewed Intra-OP anesthesia mangement and issues during anesthesia, Set expectations for post-procedure period and Allowed opportunity for questions and acknowledgement of understanding  Vitals:  Vitals Value Taken Time   /84 08/08/23 0915   Temp     Pulse 55 08/08/23 0915   Resp 18 08/08/23 0900   SpO2 94 % 08/08/23 0925   Vitals shown include unvalidated device data.    Electronically Signed By: CHRISTIAN Quiroz CRNA  August 8, 2023  9:26 AM

## 2023-08-08 NOTE — LETTER
August 10, 2023      Koko Serrano  [address] [place] [id number]        Dear ,    We are writing to inform you of your test results.    To make an appointment call Please call [phone], for  [facility] or  for [facility], to schedule a follow up appointment if needed.     Your pathology report was:  Normal polyp, please follow up by having a repeat upper endoscopy (EGD), if your symptoms worsen.        If you have questions, please contact your primary care doctor.    Showed findings consistent with reflux (heartburn)   If you are doing well with your treatment for mild reflux disease (heartburn), then follow up as directed on your post-endoscopy letter.  If you are still having problems then:   If you are interested in other options for your treatment of reflux disease (heartburn), then please see the options below.  We will first need to study your esophagus and make sure that it is working properly. The study is called a mamometry study. It involves placing a small catheter into your esophagus and measuring the pressures and coordination of your esophagus when swallowing.  If you wish to have this done, please call the above numbers to set up the study.  After the study is completed you will need to set up a follow up appointment with me to discuss the results and treatment options. To schedule an appointment, please call our Specialty Scheduling Line at [phone].   Or you may just call our Specialty Scheduling Line at [phone] to set up an appointment with me to discuss you options.    Negative for bacteria that sometimes causes inflammation of the stomach.     Showed findings consistent with mildly inflamed stomach. Please follow up with your primary care doctor or with myself by calling our Specialty Scheduling Line at [phone] if you continue to have upper abdominal pain.    Sincerely,         Aime Blum M.D.      Resulted  Orders   Surgical Pathology Exam   Result Value Ref Range    Case Report       Surgical Pathology Report                         Case: [id number]                                  Authorizing Provider:  Aime Blum MD Collected:           08/08/2023 08:37 AM          Ordering Location:     [facility]   Received:            08/08/2023 02:38 PM                                 [place]                                                                      Pathologist:           Rik Pereira MD                                                           Specimens:   A) - Stomach, Antrum, for path and h pylori                                                         B) - Stomach, Body                                                                                  C) - Esophagus, Distal                                                                     Final Diagnosis       A.  Stomach, antrum: Biopsy:  - Antral type mucosa with mild chronic inflammation  - Fragment of benign duodenal tissue  - Immunostain for Helicobacter pylori is negative     B.  Stomach, body: Polypectomy:  - Fundic gland polyp, negative for dysplasia     C.  Esophagus, distal: Biopsy:  - Squamous mucosa with reflux esophagitis  - Columnar mucosa with chronic inflammation, negative for intestinal metaplasia and dysplasia      Clinical Information       Procedure:  Esophagoscopy, gastroscopy, duodenoscopy (EGD), combined  Pre-op Diagnosis: Esophageal dysphagia [R13.19]  Post-op Diagnosis: R13.19 - Esophageal dysphagia [ICD-10-CM]      Gross Description       A(1). Stomach, Antrum, for path and h pylori:  The specimen is received in formalin, labeled with the patient's name, medical record number and other identifying information and designated  stomach, antrum . It consists of 4 tan soft tissue fragments ranging from 0.1-0.2 cm. Entirely submitted in one cassette.    B(2). Stomach, Body, :  The specimen is received in formalin,  labeled with the patient's name, medical record number and other identifying information and designated  stomach, body . It consists of a single tan soft tissue fragment measuring 0.3 cm. Entirely submitted in one cassette.    C(3). Esophagus, Distal, :  The specimen is received in formalin, labeled with the patient's name, medical record number and other identifying information and designated  distal esophagus . It consists of two tan soft tissue fragments, 0.2 and 0.4 cm. Entirely submitted in one cassette.   (JEANETTE Vee)      Microscopic Description       The microscopic findings substantiates the final diagnosis.    All immunohistochemical stains were performed with adequate controls.         Performing Labs       The technical component of this testing was completed at [facility]      Case Images         If you have any questions or concerns, please call the clinic at the number listed above.       Sincerely,      Aime Blum MD

## 2023-08-08 NOTE — H&P
ENDOSCOPY PRE-SEDATION H&P FOR OUTPATIENT PROCEDURES    Koko Serrano  [id number]  1960    Procedure:  EGD with possible biopsy possible dilatation  with MAC sedation.     Pre-procedure diagnosis: lower esophagus dysphagia    Past medical history:   Past Medical History:   Diagnosis Date    Injury, other and unspecified, elbow, forearm, and wrist     Unspecified disorder of lipoid metabolism        Past surgical history:   Past Surgical History:   Procedure Laterality Date    COLONOSCOPY  2003    COLONOSCOPY  5/1/2014    Procedure: COLONOSCOPY;  Surgeon: Wing Hyatt MD;  Location: [facility]    HC REMOVE TONSILS/ADENOIDS,10+ Y/O      T & A 10+y.o.    SURGICAL HISTORY OF -       Hernia Repair x2    SURGICAL HISTORY OF -       Thyroglassal Cyst Surgery    SURGICAL HISTORY OF -       Cystourethroscopy, right ureteral reimplantation on 7-    [facility] APPENDECTOMY         Current Facility-Administered Medications   Medication    lactated ringers infusion    lidocaine (LMX4) kit    lidocaine 1 % 0.1-1 mL    sodium chloride (PF) 0.9% PF flush 3 mL    sodium chloride (PF) 0.9% PF flush 3 mL       Allergies   Allergen Reactions    Nka [No Known Allergies]        History of Anesthesia/Sedation Problems: no    Physical Exam:    Mental status: alert  Heart: Normal  Lung: Normal  Assessment of patient's airway: Normal  Other as pertinent for procedure: None     ASA Score: See Provation note    Mallampati score:  II - Faucial pillars and soft palate may be seen, but uvula is masked by the base of the tongue    Assessment/Plan:     The patient is an appropriate candidate to receive sedation.    Informed consent was discussed with the patient/family, including the risks, benefits, potential complications and any alternative options associated with sedation.    Patient assessment completed just prior to sedation and while under constant observation by the provider. Condition determined to be adequate for proceeding with  sedation.    The specific risks for the procedure were discussed with the patient at the time of informed consent and include but are not limited to perforation which could require surgery, missing significant neoplasm or lesion, hemorrhage and adverse sedative complication.      Aime Blum MD

## 2023-08-08 NOTE — ANESTHESIA POSTPROCEDURE EVALUATION
Patient: Koko Serrano    Procedure: Procedure(s):  Esophagoscopy, gastroscopy, duodenoscopy (EGD), combined       Anesthesia Type:  No value filed.    Note:  Disposition: Outpatient   Postop Pain Control:             Sign Out: Well controlled pain   PONV:    Neuro/Psych:             Sign Out: Acceptable/Baseline neuro status   Airway/Respiratory:             Sign Out: Acceptable/Baseline resp. status   CV/Hemodynamics:             Sign Out: Acceptable CV status   Other NRE: NONE   DID A NON-ROUTINE EVENT OCCUR? No       Last vitals:  Vitals Value Taken Time   /84 08/08/23 0915   Temp     Pulse 55 08/08/23 0915   Resp 18 08/08/23 0900   SpO2 98 % 08/08/23 0928   Vitals shown include unvalidated device data.    Electronically Signed By: CHRISTIAN Quiroz CRNA  August 8, 2023  9:29 AM

## 2023-08-10 ENCOUNTER — TELEPHONE (OUTPATIENT)
Dept: PHYSICAL THERAPY | Facility: CLINIC | Age: 63
End: 2023-08-10
Payer: COMMERCIAL

## 2023-08-10 ENCOUNTER — THERAPY VISIT (OUTPATIENT)
Dept: PHYSICAL THERAPY | Facility: CLINIC | Age: 63
End: 2023-08-10
Attending: PEDIATRICS
Payer: COMMERCIAL

## 2023-08-10 DIAGNOSIS — M25.562 CHRONIC PAIN OF LEFT KNEE: ICD-10-CM

## 2023-08-10 DIAGNOSIS — G89.29 CHRONIC PAIN OF LEFT KNEE: ICD-10-CM

## 2023-08-10 DIAGNOSIS — M25.562 CHRONIC PAIN OF LEFT KNEE: Primary | ICD-10-CM

## 2023-08-10 DIAGNOSIS — M17.12 ARTHRITIS OF LEFT KNEE: ICD-10-CM

## 2023-08-10 DIAGNOSIS — M17.12 DEGENERATIVE ARTHRITIS OF LEFT KNEE: ICD-10-CM

## 2023-08-10 DIAGNOSIS — G89.29 CHRONIC PAIN OF LEFT KNEE: Primary | ICD-10-CM

## 2023-08-10 LAB
PATH REPORT.COMMENTS IMP SPEC: NORMAL
PATH REPORT.COMMENTS IMP SPEC: NORMAL
PATH REPORT.FINAL DX SPEC: NORMAL
PATH REPORT.GROSS SPEC: NORMAL
PATH REPORT.MICROSCOPIC SPEC OTHER STN: NORMAL
PATH REPORT.RELEVANT HX SPEC: NORMAL
PHOTO IMAGE: NORMAL

## 2023-08-10 PROCEDURE — 88305 TISSUE EXAM BY PATHOLOGIST: CPT | Mod: 26 | Performed by: PATHOLOGY

## 2023-08-10 PROCEDURE — 97110 THERAPEUTIC EXERCISES: CPT | Mod: GP | Performed by: PHYSICAL THERAPIST

## 2023-08-10 PROCEDURE — 97535 SELF CARE MNGMENT TRAINING: CPT | Mod: GP | Performed by: PHYSICAL THERAPIST

## 2023-08-10 PROCEDURE — 88342 IMHCHEM/IMCYTCHM 1ST ANTB: CPT | Mod: 26 | Performed by: PATHOLOGY

## 2023-08-10 NOTE — PROGRESS NOTES
[facility]                                                                                   OUTPATIENT PHYSICAL THERAPY    PLAN OF TREATMENT FOR OUTPATIENT REHABILITATION   Patient's Last Name, First Name, Koko Pedroza YOB: 1960   Provider's Name   [facility]   Medical Record No.  [id number]     Onset Date: 07/10/23 (date of order, chronic condition)  Start of Care Date: 07/13/23     Medical Diagnosis:  Chronic pain of left knee; Chronic bilateral low back pain with right-sided sciatica      PT Treatment Diagnosis:  chronic LBP; L knee pain Plan of Treatment  Frequency/Duration: 1x/week/ 8 weeks    Certification date from 08/09/23 to 10/05/23         See note for plan of treatment details and functional goals     Shae Figueroa, PT                         I CERTIFY THE NEED FOR THESE SERVICES FURNISHED UNDER        THIS PLAN OF TREATMENT AND WHILE UNDER MY CARE     (Physician attestation of this document indicates review and certification of the therapy plan).                Referring Provider:  Cathi Moctezuma      Initial Assessment  See Epic Evaluation- Start of Care Date: 07/13/23           08/10/23 0500   Appointment Info   Signing clinician's name / credentials Shae Figueroa, PT, DPT, OCS   Total/Authorized Visits 8   Visits Used 3   Medical Diagnosis Chronic pain of left knee; Chronic bilateral low back pain with right-sided sciatica   PT Tx Diagnosis chronic LBP; L knee pain   Precautions/Limitations none   Other pertinent information Amanda Dorsey Adds Certification   Progress Note/Certification   Start of Care Date 07/13/23   Onset of illness/injury or Date of Surgery 07/10/23  (date of order, chronic condition)   Therapy Frequency 1x/week   Predicted Duration 8 weeks   Certification date from 08/09/23   Certification date to 10/05/23   Progress Note Due Date 10/05/23   GOALS   PT Goals 2;3   PT Goal 1  "  Goal Identifier 1   Goal Description Pt will be able to perform lumbar mobility ROM without increase in sx in order to decrease difficulty with ADLs   Goal Progress increased sx still   Target Date 10/05/23   PT Goal 2   Goal Identifier 2   Goal Description Pt will be able to demonstrate 5/5 global LE strength in order to decrease difficulty with ADLs   Goal Progress not formally tested with MMT today, but weakness noted in L quad when performing step down   Target Date 10/05/23   PT Goal 3   Goal Identifier 3   Goal Description Pt will be independent with HEP in order to self manage symptoms   Goal Progress LTG, mod I with current HEP   Target Date 10/05/23   Subjective Report   Subjective Report Here for a brace trial today.   Objective Measures   Objective Measures Objective Measure 1;Objective Measure 2;Objective Measure 3;Objective Measure 4;Objective Measure 5   Objective Measure 1   Objective Measure lumbar ROM   Objective Measure 2   Objective Measure L knee ROM and strength   Objective Measure 3   Objective Measure LEFS   Objective Measure 4   Objective Measure MELO/nick   Objective Measure 5   Objective Measure 8\" forward step down   Details No brace: 3/10 pain with poor eccentric quad control compared to normal on the R. With brace on: pain about the same but improved eccentric control with stepping down on the L   Treatment Interventions (PT)   Interventions Therapeutic Procedure/Exercise;Manual Therapy;Self Care/Home Management   Therapeutic Procedure/Exercise   Therapeutic Procedures: strength, endurance, ROM, flexibillity minutes (89031) 15   Ther Proc 1 - Details Supine SLR 2 x 10. QS w/towel rolls 5' holds x 10-20 reps. wall sits at 45-60 deg angle, 30 sec holds x 3 reps.   Skilled Intervention ROM/strength to decrease difficulty with ADLs   Patient Response/Progress tolerated well, no pain   Self Care/home Management   ADL/Home Mgmt Training (20660) 30   Self Care 1 - Details  bracing " trial. Measured pt for correct size. instr pt in purpose of brace and how to use, how to don/doff brace and the process of obtaining one. See note in Epic for further details.   Skilled Intervention home mgmt of pain and improved ADLs with bracing   Patient Response/Progress See note in Epic for details, trial succesful   Eval/Assessments   Assessments   (Pt able to progress HEP during session without increase in sx. Initiated lumbar distraction to improve jt mobility and decrease pain)   Education   Learner/Method Patient;Listening;Reading;Demonstration;Pictures/Video   Education Comments pt demonstrated and verbalized good understanding of exercises   Plan   Home program [id number]   Updates to plan of care DME pended to physician to sign. Informed pt [facility] will call his insurance to determine what if any cost is covered, then will call him to schedule and appointment and inform him of cost.   Plan for next session f/u with primary therapist in 2-3 weeks to assess new ex given today and to assess and progress current HEP.   Comments   Comments pt will wait to see cost of brace before completely purchasing   Total Session Time   Timed Code Treatment Minutes 45   Total Treatment Time (sum of timed and untimed services) 45         Please contact me with any questions or concerns.  Thank you for your referral.    Shae Figueroa, PT, DPT, OCS  Physical Therapist, Orthopedic Certified Specialist    [facility]  [address] [place] [id number]  [email or web address]   Office: [phone]   Employed by [employer]

## 2023-08-10 NOTE — PROGRESS NOTES
Ossur Knee  Brace Trial:     Pain Rating    Affected Joint: Left Knee    Without  brace:    At rest 1/10  Walkin/10    Squattin/10   Stairs: 310      Wearing  brace:    At rest 010  Walkin10    Squattin/10   Stairs: 3/10, no change in pain but felt a lot stronger, improved control with stepping down     Brace and Measurements:    Brace trialed:     Type - Ossur  One  Size - Medium  Side - Medial  Affected Knee - Left Knee    Circumference 6 inches below mid patella: 14 inches (Ossur  One/X)    Other Comments:  See PT evaluation in Epic for any additional information including joint special testing/ligament testing

## 2023-09-13 ENCOUNTER — PATIENT OUTREACH (OUTPATIENT)
Dept: GERIATRIC MEDICINE | Facility: CLINIC | Age: 63
End: 2023-09-13
Payer: COMMERCIAL

## 2023-09-13 NOTE — PROGRESS NOTES
[facility] Care Coordination Contact    Member became effective with  [facility] on 9/1/2023 with [facility].  Previous Health Plan: [facility]  Previous Care System: [facility]  Previous care coordinators name and number: CELSA Mensah Type: N/A  Last MMIS Entry: Date 8/18/23 and Type TELE ASSMT  MMIS visit date (and type) if different from above: NA  Services Listed in MMIS:   UTF received: No: Requested on 9/13/2023  Address/Phone discrepancy: KECIA Ahumada  Care Management Specialist  [facility]  [phone]

## 2023-09-13 NOTE — LETTER
September 13, 2023    KOKO MARMOLEJO  [address] [place] [id number]      Dear Koko:    As a member of [facility] ([facility]) ([facility]), you are provided a care coordinator. I will be your new care coordinator as of 9/1/2023. I will be calling you soon to see how you are doing and determine your needs.    If you have any questions, please feel free to call me at [phone]. If you reach my voice mail, please leave a message and your phone number. If you are hearing impaired, please call the [place] Relay at 608 or [phone] (vumimw-lh-hyatfb relay service).    I look forward to speaking with you soon.    Sincerely,    Julia Cook RN    E-mail: [email or web address]  Phone: [phone]      [facility] is a health plan that contracts with both Medicare and the [place] Medical Assistance (Medicaid) program to provide benefits of both programs to enrollees. Enrollment in [facility] depends on contract renewal.      [facility]  [id number] DHS Approved ([id number])  [id number] (11/18)

## 2023-09-14 ENCOUNTER — PATIENT OUTREACH (OUTPATIENT)
Dept: GERIATRIC MEDICINE | Facility: CLINIC | Age: 63
End: 2023-09-14
Payer: COMMERCIAL

## 2023-09-18 NOTE — PROGRESS NOTES
[facility] Care Coordination Contact      [facility] Health Plan or Product Change    CC received notification that member's health plan or health plan product changed from [employer] MSC+ to [employer] MSHO effective 9/1/23.      Called member and introduced self as member s new CC. Confirmed with member that the welcome letter with writer's name and contact information has been received.  Reviewed [facility]/Health Risk Assessment (HRA) and POC with member. No changes noted.  Transitional HRA completed. Care Plan Summary updated and reflects current services.  Required referral authorization information communicated to CMS: No  Writer reviewed the following with member:  ER visits: No  Hospitalizations: No  TCU stays: No  Significant health status changes: none  Falls/Injuries: No  ADL/IADL changes: No  Changes in services: No    Follow-Up Plan: Member informed of future contact, plan to f/u with member with at next regularly scheduled contact.  Contact information shared with member and family, encouraged member to call with any questions or concerns.    Julia Cook RN  [facility]  [phone]

## 2023-10-04 ENCOUNTER — MYC REFILL (OUTPATIENT)
Dept: FAMILY MEDICINE | Facility: CLINIC | Age: 63
End: 2023-10-04
Payer: COMMERCIAL

## 2023-10-04 DIAGNOSIS — N52.9 ERECTILE DYSFUNCTION, UNSPECIFIED ERECTILE DYSFUNCTION TYPE: ICD-10-CM

## 2023-10-04 DIAGNOSIS — N40.1 BPH WITH OBSTRUCTION/LOWER URINARY TRACT SYMPTOMS: Primary | ICD-10-CM

## 2023-10-04 DIAGNOSIS — N13.8 BPH WITH OBSTRUCTION/LOWER URINARY TRACT SYMPTOMS: Primary | ICD-10-CM

## 2023-10-05 RX ORDER — TADALAFIL 5 MG/1
5 TABLET ORAL
Qty: 10 TABLET | Refills: 0 | Status: SHIPPED | OUTPATIENT
Start: 2023-10-05 | End: 2024-07-25 | Stop reason: DRUGHIGH

## 2023-10-05 NOTE — TELEPHONE ENCOUNTER
Refilled x 10 until Primary Care Provider back in office.  Looks like he is overdue for follow up with Urology note reviewed 4/22/2022  Thanks Oralia SANCHEZ-BC

## 2023-10-05 NOTE — TELEPHONE ENCOUNTER
Routing refill request to provider for review/approval because:  Medication is reported/historical    Julie Behrendt RN

## 2023-10-08 NOTE — NURSING NOTE
"Chief Complaint   Patient presents with     Cough     3 weeks been coughing going into 4 weeks.       Initial /64 (BP Location: Right arm, Patient Position: Sitting, Cuff Size: Adult Regular)   Pulse 105   Temp 98.6  F (37  C) (Tympanic)   Resp 18   Ht 1.778 m (5' 10\")   Wt 81.7 kg (180 lb 3.2 oz)   SpO2 98%   BMI 25.86 kg/m   Estimated body mass index is 25.86 kg/m  as calculated from the following:    Height as of this encounter: 1.778 m (5' 10\").    Weight as of this encounter: 81.7 kg (180 lb 3.2 oz).    Patient presents to the clinic using No DME    Health Maintenance that is potentially due pending provider review:  NONE    n/a    Is there anyone who you would like to be able to receive your results? No  If yes have patient fill out JOANNE  Wen Monroe CMA      "
08-Oct-2023 06:00

## 2023-10-12 PROBLEM — M25.562 CHRONIC PAIN OF LEFT KNEE: Status: RESOLVED | Noted: 2023-07-13 | Resolved: 2023-10-12

## 2023-10-12 PROBLEM — G89.29 CHRONIC PAIN OF LEFT KNEE: Status: RESOLVED | Noted: 2023-07-13 | Resolved: 2023-10-12

## 2023-10-12 NOTE — PROGRESS NOTES
Outpatient Physical Therapy Discharge Note     Patient: Koko Serrano  : 1960    Beginning/End Dates of Reporting Period:  23 to 8/10/23    Referring Provider: Farooq Cárdenas PA-C    Therapy Diagnosis:  chronic LBP; L knee pain      Patient did not return for follow up treatments as directed.  Goal status and current objective information is therefore unknown.  Discharge from PT services at this time for this episode of treatment. Please see attached documentation under this episode of care for further information including dates of service, start of care date, referring physician, Dx, treatment plan, treatments, etc.    Please contact me with any questions or concerns.    Thank you for your referral.    Dory Scott, PT, DPT  Physical Therapist  [address] [address] [phone]

## 2023-10-16 ENCOUNTER — MYC MEDICAL ADVICE (OUTPATIENT)
Dept: FAMILY MEDICINE | Facility: CLINIC | Age: 63
End: 2023-10-16
Payer: COMMERCIAL

## 2023-10-25 ENCOUNTER — LAB (OUTPATIENT)
Dept: LAB | Facility: CLINIC | Age: 63
End: 2023-10-25
Payer: COMMERCIAL

## 2023-10-25 DIAGNOSIS — E78.5 HYPERLIPIDEMIA LDL GOAL <130: ICD-10-CM

## 2023-10-25 DIAGNOSIS — Z12.5 SCREENING FOR PROSTATE CANCER: ICD-10-CM

## 2023-10-25 LAB
ALBUMIN SERPL BCG-MCNC: 4.5 G/DL (ref 3.5–5.2)
ALP SERPL-CCNC: 64 U/L (ref 40–129)
ALT SERPL W P-5'-P-CCNC: 28 U/L (ref 0–70)
AST SERPL W P-5'-P-CCNC: 24 U/L (ref 0–45)
BILIRUB DIRECT SERPL-MCNC: <0.2 MG/DL (ref 0–0.3)
BILIRUB SERPL-MCNC: 0.4 MG/DL
CHOLEST SERPL-MCNC: 134 MG/DL
HDLC SERPL-MCNC: 47 MG/DL
LDLC SERPL CALC-MCNC: 67 MG/DL
NONHDLC SERPL-MCNC: 87 MG/DL
PROT SERPL-MCNC: 7 G/DL (ref 6.4–8.3)
PSA SERPL DL<=0.01 NG/ML-MCNC: 3.21 NG/ML (ref 0–4.5)
TRIGL SERPL-MCNC: 99 MG/DL

## 2023-10-25 PROCEDURE — 80061 LIPID PANEL: CPT

## 2023-10-25 PROCEDURE — 80076 HEPATIC FUNCTION PANEL: CPT

## 2023-10-25 PROCEDURE — G0103 PSA SCREENING: HCPCS

## 2023-10-25 PROCEDURE — 36415 COLL VENOUS BLD VENIPUNCTURE: CPT

## 2023-10-26 ENCOUNTER — MYC MEDICAL ADVICE (OUTPATIENT)
Dept: FAMILY MEDICINE | Facility: CLINIC | Age: 63
End: 2023-10-26
Payer: COMMERCIAL

## 2023-10-26 ENCOUNTER — DOCUMENTATION ONLY (OUTPATIENT)
Dept: FAMILY MEDICINE | Facility: CLINIC | Age: 63
End: 2023-10-26
Payer: COMMERCIAL

## 2023-10-26 DIAGNOSIS — Z11.4 SCREENING FOR HIV (HUMAN IMMUNODEFICIENCY VIRUS): ICD-10-CM

## 2023-10-26 DIAGNOSIS — E78.5 HYPERLIPIDEMIA LDL GOAL <130: ICD-10-CM

## 2023-10-26 DIAGNOSIS — Z11.59 NEED FOR HEPATITIS C SCREENING TEST: ICD-10-CM

## 2023-10-26 RX ORDER — ROSUVASTATIN CALCIUM 20 MG/1
20 TABLET, COATED ORAL DAILY
Qty: 90 TABLET | Refills: 3 | Status: SHIPPED | OUTPATIENT
Start: 2023-10-26 | End: 2024-07-25

## 2023-10-26 NOTE — PROGRESS NOTES
Koko Serrano has an upcoming lab appointment:    Future Appointments   Date Time Provider Department Center   10/30/2023  3:00 PM NB LAB [facility]   7/26/2024  8:30 AM Farooq Cárdenas PA-C [facility]     Patient is scheduled for the following lab(s): revisit lab work per patient    There is no order available. Please review and place either future orders or HMPO (Review of Health Maintenance Protocol Orders), as appropriate.    Health Maintenance Due   Topic    HIV SCREENING     HEPATITIS C SCREENING      Guerda Winchester

## 2023-10-27 NOTE — PROGRESS NOTES
Ordered preventative labs, but patient does not need anything else from me.     Farooq Cárdenas PA-C

## 2023-10-30 ENCOUNTER — LAB (OUTPATIENT)
Dept: LAB | Facility: CLINIC | Age: 63
End: 2023-10-30
Attending: PHYSICIAN ASSISTANT
Payer: COMMERCIAL

## 2023-10-30 DIAGNOSIS — Z11.4 SCREENING FOR HIV (HUMAN IMMUNODEFICIENCY VIRUS): ICD-10-CM

## 2023-10-30 DIAGNOSIS — Z11.59 NEED FOR HEPATITIS C SCREENING TEST: ICD-10-CM

## 2023-10-30 PROCEDURE — 36415 COLL VENOUS BLD VENIPUNCTURE: CPT

## 2023-10-30 PROCEDURE — 87389 HIV-1 AG W/HIV-1&-2 AB AG IA: CPT

## 2023-10-30 PROCEDURE — 86803 HEPATITIS C AB TEST: CPT

## 2023-10-31 LAB
HCV AB SERPL QL IA: NONREACTIVE
HIV 1+2 AB+HIV1 P24 AG SERPL QL IA: NONREACTIVE

## 2024-01-10 ENCOUNTER — LAB REQUISITION (OUTPATIENT)
Dept: LAB | Facility: CLINIC | Age: 64
End: 2024-01-10

## 2024-01-10 LAB
LAB DIRECTOR COMMENTS: NORMAL
LAB DIRECTOR DISCLAIMER: NORMAL
LAB DIRECTOR INTERPRETATION: NORMAL
LAB DIRECTOR METHODOLOGY: NORMAL
LAB DIRECTOR RESULTS: NORMAL
SPECIMEN DESCRIPTION: NORMAL

## 2024-01-10 PROCEDURE — 81271 HTT GENE DETC ABNOR ALLELES: CPT | Performed by: PSYCHIATRY & NEUROLOGY

## 2024-02-19 ENCOUNTER — PATIENT OUTREACH (OUTPATIENT)
Dept: GERIATRIC MEDICINE | Facility: CLINIC | Age: 64
End: 2024-02-19
Payer: COMMERCIAL

## 2024-02-19 NOTE — PROGRESS NOTES
[facility] Care Coordination Contact      [facility] Six-Month Telephone Assessment    6 month telephone assessment completed on 2/19/24.    ER visits: No  Hospitalizations: No  TCU stays: No  Significant health status changes: none. Discussed [employer] supplemental benefits. Offered reemo watch, declined at this time. Discussed benefits of pool exercise for joint/back pain.   Falls/Injuries: No  ADL/IADL changes: No  Changes in services: No    Caregiver Assessment follow up:  none    Goals: See POC in chart for goal progress documentation.      Will see member in 6 months for an annual health risk assessment.   Encouraged member to call CC with any questions or concerns in the meantime.     Julia Cook RN  [facility]  [phone]

## 2024-03-11 ENCOUNTER — OFFICE VISIT (OUTPATIENT)
Dept: FAMILY MEDICINE | Facility: CLINIC | Age: 64
End: 2024-03-11
Payer: COMMERCIAL

## 2024-03-11 VITALS
DIASTOLIC BLOOD PRESSURE: 68 MMHG | BODY MASS INDEX: 27.11 KG/M2 | SYSTOLIC BLOOD PRESSURE: 110 MMHG | HEIGHT: 69 IN | TEMPERATURE: 97.6 F | HEART RATE: 71 BPM | WEIGHT: 183 LBS | OXYGEN SATURATION: 97 % | RESPIRATION RATE: 16 BRPM

## 2024-03-11 DIAGNOSIS — Z29.11 NEED FOR VACCINATION AGAINST RESPIRATORY SYNCYTIAL VIRUS: ICD-10-CM

## 2024-03-11 DIAGNOSIS — L23.7 CONTACT DERMATITIS DUE TO POISON IVY: Primary | ICD-10-CM

## 2024-03-11 DIAGNOSIS — Z23 NEED FOR SHINGLES VACCINE: ICD-10-CM

## 2024-03-11 PROCEDURE — 99214 OFFICE O/P EST MOD 30 MIN: CPT | Performed by: PHYSICIAN ASSISTANT

## 2024-03-11 RX ORDER — TURMERIC 400 MG
CAPSULE ORAL
COMMUNITY

## 2024-03-11 RX ORDER — PREDNISONE 20 MG/1
TABLET ORAL
Qty: 20 TABLET | Refills: 0 | Status: SHIPPED | OUTPATIENT
Start: 2024-03-11 | End: 2024-07-24

## 2024-03-11 ASSESSMENT — PAIN SCALES - GENERAL: PAINLEVEL: MODERATE PAIN (4)

## 2024-03-11 NOTE — NURSING NOTE
"Chief Complaint   Patient presents with    Rash       Initial /68 (BP Location: Right arm, Patient Position: Sitting, Cuff Size: Adult Large)   Pulse 71   Temp 97.6  F (36.4  C) (Tympanic)   Resp 16   Ht 1.746 m (5' 8.74\")   Wt 83 kg (183 lb)   SpO2 97%   BMI 27.23 kg/m   Estimated body mass index is 27.23 kg/m  as calculated from the following:    Height as of this encounter: 1.746 m (5' 8.74\").    Weight as of this encounter: 83 kg (183 lb).    Patient presents to the clinic using No DME    Is there anyone who you would like to be able to receive your results? No  If yes have patient fill out JOANNE      "

## 2024-03-11 NOTE — PROGRESS NOTES
"  Assessment & Plan   Contact dermatitis due to poison ivy  Pt presenting with multiple pruritic lesions on his face, wrists, behind his ears, and on his penis. Believes that he came into contact with poison ivy over the last week and that this is the cause of the rash. Given the hx and presentation on exam, this is most consistent with contact dermatitis due to poison ivy. Did consider disseminated herpes zoster, but patient is not acutely ill. Since the rash covers multiple regions on his body, and some regions where a steroid cream would not be appropriate, a short course of prednisone was prescribed today. RTC prn for any new, changing or worsening symptoms.    - predniSONE (DELTASONE) 20 MG tablet; Take 3 tabs by mouth daily x 3 days, then 2 tabs daily x 3 days, then 1 tab daily x 3 days, then 1/2 tab daily x 3 days.    Need for shingles vaccine  Need for vaccination against respiratory syncytial virus  Pt should receive these at his pharmacy.     BMI  Estimated body mass index is 27.23 kg/m  as calculated from the following:    Height as of this encounter: 1.746 m (5' 8.74\").    Weight as of this encounter: 83 kg (183 lb).       Margot Sutherland is a 64 year old, presenting for the following health issues:  Rash        7/20/2023     9:02 AM   Additional Questions   Roomed by Mirna MARVIN CMA     History of Present Illness       Reason for visit:  Poison ivy  Symptom onset:  3-7 days ago  Symptom intensity:  Moderate  Symptom progression:  Staying the same  Had these symptoms before:  Yes  Has tried/received treatment for these symptoms:  Yes  Previous treatment was successful:  Yes  Prior treatment description:  Steroid shot    He eats 4 or more servings of fruits and vegetables daily.He consumes 0 sweetened beverage(s) daily.He exercises with enough effort to increase his heart rate 9 or less minutes per day.  He exercises with enough effort to increase his heart rate 3 or less days per week.   He is taking " "medications regularly.     Pt presents with a 5 day hx of an itchy, red, rash on flexor portion of right wrist, on forehead above the eyebrows, behind bilateral ears, on left eyelid, and on his penis. He has had poison ivy in the past and notes that this feels similar, however does not look the same given there is no pustules. Recently handled a skunk, however was wearing rubber gloves, and recently went [facility] pool [facility] the [facility]. Denies any recent change in soaps, laundry detergent, toilet paper or any hx of allergies. Started ivarest scrub and cream 2 days ago in attempt to alleviate symptoms, with minimal success. Pt is  and monogamous. 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     Review of Systems  See HPI      Objective    /68 (BP Location: Right arm, Patient Position: Sitting, Cuff Size: Adult Large)   Pulse 71   Temp 97.6  F (36.4  C) (Tympanic)   Resp 16   Ht 1.746 m (5' 8.74\")   Wt 83 kg (183 lb)   SpO2 97%   BMI 27.23 kg/m    Body mass index is 27.23 kg/m .  Physical Exam   GENERAL: alert and no distress  RESP: no obvious signs of respiratory distress. No audible wheezes.  SKIN: small areas of an erythematous based papulo-vesicular rash noted behind bilateral ears, on face, on shaft of penis, and on wrists. Linear pattern on the R forearm.     Shae REID student   03/11/24      Physician Attestation   I, Farooq Cárdenas PA-C, was present with the medical/TARUN student who participated in the service and in the documentation of the note.  I have verified the history and personally performed the physical exam and medical decision making.  I agree with the assessment and plan of care as documented in the note.      Farooq Cárdenas PA-C   "

## 2024-06-03 ENCOUNTER — PATIENT OUTREACH (OUTPATIENT)
Dept: GASTROENTEROLOGY | Facility: CLINIC | Age: 64
End: 2024-06-03
Payer: COMMERCIAL

## 2024-06-03 DIAGNOSIS — Z12.11 SPECIAL SCREENING FOR MALIGNANT NEOPLASMS, COLON: Primary | ICD-10-CM

## 2024-06-03 NOTE — PROGRESS NOTES
"CRC Screening Colonoscopy Referral Review    Patient meets the inclusion criteria for screening colonoscopy standing order.    Ordering/Referring Provider:  Farooq Cárdenas    BMI: Estimated body mass index is 27.23 kg/m  as calculated from the following:    Height as of 3/11/24: 1.746 m (5' 8.74\").    Weight as of 3/11/24: 83 kg (183 lb).     Sedation:  Does patient have any of the following conditions affecting sedation?  No medical conditions affecting sedation.    Previous Scopes:  Any previous recommendations or follow up needs based on previous scope?  na / No recommendations.    Medical Concerns to Postpone Order:  Does patient have any of the following medical concerns that should postpone/delay colonoscopy referral?  No medical conditions affecting colonoscopy referral.    Final Referral Details:  Based on patient's medical history patient is appropriate for referral order with moderate sedation. If patient's BMI > 50 do not schedule in ASC.  "

## 2024-07-02 ENCOUNTER — DOCUMENTATION ONLY (OUTPATIENT)
Dept: FAMILY MEDICINE | Facility: CLINIC | Age: 64
End: 2024-07-02
Payer: COMMERCIAL

## 2024-07-02 DIAGNOSIS — E78.5 HYPERLIPIDEMIA LDL GOAL <130: Primary | ICD-10-CM

## 2024-07-02 NOTE — PROGRESS NOTES
Patient is coming for labs per Trace Cárdenas.  Appointment note states he is coming for A1c/fasting labs, please place future orders or contact patient ASAP.  Thanks!

## 2024-07-02 NOTE — PROGRESS NOTES
"See telephone encounter below.     Patient has lab only appointment 7/19/24.    No results found for: \"A1C\"  Recent Labs   Lab Test 10/25/23  0910 07/10/23  1523   CHOL 134 262*   HDL 47 37*   LDL 67 166*   TRIG 99 293*   Date of last OV with Trace REID 3/11/24.  Order pended and message routed to provider for consideration.     Julie Behrendt RN    "

## 2024-07-11 ENCOUNTER — HOSPITAL ENCOUNTER (EMERGENCY)
Facility: CLINIC | Age: 64
Discharge: HOME | End: 2024-07-11
Payer: COMMERCIAL

## 2024-07-11 ENCOUNTER — PATIENT OUTREACH (OUTPATIENT)
Dept: GERIATRIC MEDICINE | Facility: CLINIC | Age: 64
End: 2024-07-11

## 2024-07-11 ENCOUNTER — OFFICE VISIT (OUTPATIENT)
Dept: URGENT CARE | Facility: URGENT CARE | Age: 64
End: 2024-07-11
Payer: COMMERCIAL

## 2024-07-11 VITALS
HEART RATE: 62 BPM | OXYGEN SATURATION: 99 % | RESPIRATION RATE: 16 BRPM | WEIGHT: 175 LBS | BODY MASS INDEX: 26.04 KG/M2

## 2024-07-11 VITALS
HEART RATE: 59 BPM | BODY MASS INDEX: 25.62 KG/M2 | SYSTOLIC BLOOD PRESSURE: 106 MMHG | RESPIRATION RATE: 18 BRPM | TEMPERATURE: 98.7 F | DIASTOLIC BLOOD PRESSURE: 70 MMHG | WEIGHT: 173 LBS | HEIGHT: 69 IN | OXYGEN SATURATION: 98 %

## 2024-07-11 DIAGNOSIS — Z20.3 NEED FOR POST EXPOSURE PROPHYLAXIS FOR RABIES: ICD-10-CM

## 2024-07-11 DIAGNOSIS — T14.8XXA WILD ANIMAL BITE: Primary | ICD-10-CM

## 2024-07-11 DIAGNOSIS — S61.258A ANIMAL BITE OF INDEX FINGER, INITIAL ENCOUNTER: ICD-10-CM

## 2024-07-11 PROCEDURE — 90375 RABIES IG IM/SC: CPT

## 2024-07-11 PROCEDURE — 96372 THER/PROPH/DIAG INJ SC/IM: CPT

## 2024-07-11 PROCEDURE — 250N000011 HC RX IP 250 OP 636

## 2024-07-11 PROCEDURE — 90472 IMMUNIZATION ADMIN EACH ADD: CPT

## 2024-07-11 PROCEDURE — 90715 TDAP VACCINE 7 YRS/> IM: CPT

## 2024-07-11 PROCEDURE — 90471 IMMUNIZATION ADMIN: CPT

## 2024-07-11 PROCEDURE — 99214 OFFICE O/P EST MOD 30 MIN: CPT

## 2024-07-11 PROCEDURE — 99213 OFFICE O/P EST LOW 20 MIN: CPT

## 2024-07-11 PROCEDURE — G0463 HOSPITAL OUTPT CLINIC VISIT: HCPCS | Mod: 25

## 2024-07-11 PROCEDURE — 90675 RABIES VACCINE IM: CPT

## 2024-07-11 RX ORDER — PRAVASTATIN SODIUM 40 MG
40 TABLET ORAL
COMMUNITY
End: 2024-07-25 | Stop reason: ALTCHOICE

## 2024-07-11 RX ADMIN — RABIES IMMUNE GLOBULIN (HUMAN) 1500 UNITS: 300 INJECTION, SOLUTION INFILTRATION; INTRAMUSCULAR at 13:10

## 2024-07-11 RX ADMIN — CLOSTRIDIUM TETANI TOXOID ANTIGEN (FORMALDEHYDE INACTIVATED), CORYNEBACTERIUM DIPHTHERIAE TOXOID ANTIGEN (FORMALDEHYDE INACTIVATED), BORDETELLA PERTUSSIS TOXOID ANTIGEN (GLUTARALDEHYDE INACTIVATED), BORDETELLA PERTUSSIS FILAMENTOUS HEMAGGLUTININ ANTIGEN (FORMALDEHYDE INACTIVATED), BORDETELLA PERTUSSIS PERTACTIN ANTIGEN, AND BORDETELLA PERTUSSIS FIMBRIAE 2/3 ANTIGEN 0.5 ML: 5; 2; 2.5; 5; 3; 5 INJECTION, SUSPENSION INTRAMUSCULAR at 13:16

## 2024-07-11 RX ADMIN — RABIES VACCINE 1 ML: KIT at 13:23

## 2024-07-11 ASSESSMENT — COLUMBIA-SUICIDE SEVERITY RATING SCALE - C-SSRS
6. HAVE YOU EVER DONE ANYTHING, STARTED TO DO ANYTHING, OR PREPARED TO DO ANYTHING TO END YOUR LIFE?: NO
2. HAVE YOU ACTUALLY HAD ANY THOUGHTS OF KILLING YOURSELF IN THE PAST MONTH?: NO
1. IN THE PAST MONTH, HAVE YOU WISHED YOU WERE DEAD OR WISHED YOU COULD GO TO SLEEP AND NOT WAKE UP?: NO

## 2024-07-11 ASSESSMENT — ACTIVITIES OF DAILY LIVING (ADL)
ADLS_ACUITY_SCORE: 33
ADLS_ACUITY_SCORE: 33

## 2024-07-11 NOTE — ED TRIAGE NOTES
Stella napier to left hand  Sent here for rabies     Triage Assessment (Adult)       Row Name 07/11/24 1145          Triage Assessment    Airway WDL WDL        Respiratory WDL    Respiratory WDL WDL        Peripheral/Neurovascular WDL    Peripheral Neurovascular WDL WDL

## 2024-07-11 NOTE — DISCHARGE INSTRUCTIONS
You were seen today for rabies postexposure prophylaxis.  Today is day 0.  You will have to return on day 3, day 7 and day 14 to complete the series.  Since day 3 lands on a Sunday, the [facility] clinic will be closed and you will need to return here for the shots on that day.  If you end up being in [place] then try to find a urgent care/clinic that will give you your shots and let them know that you already received your day 0 shots.  The ADS clinic will call you to schedule your day 7 and day 14 shots.  You will also be put on Augmentin to prevent infection, take this twice a day for the next 10 days.  If you notice any worsening symptoms such as increased pain, swelling, redness or puslike drainage from the wound then please return for further evaluation.

## 2024-07-11 NOTE — PROGRESS NOTES
[facility] Care Coordination Contact    Called member to schedule annual HRA home visit. HRA has been scheduled for 7/23/24 at 10am.    Julia Cook RN  [facility]  [phone]

## 2024-07-11 NOTE — PROGRESS NOTES
[facility] Care Coordination Contact  CC received notification of Emergency Room visit.  ER visit occurred on 7/11/24 at [facility] with Dx of Rabies prophylaxis after a bite from a gopher today.    CC contacted member and reviewed discharge summary. Rabies prophylaxis series started. Koko is clear on instructions for follow up visits and is taking oral antibiotics.  Member has a follow-up appointment with PCP: Yes: scheduled on 7/18/,7/19,/7/25  Member has had a change in condition: No  New referrals placed: No  Home Visit Needed: No  Care plan reviewed and updated.  PCP notified of ED visit via EMR.    Julia Cook RN  [facility]  [phone]

## 2024-07-11 NOTE — ED PROVIDER NOTES
History     Chief Complaint   Patient presents with    Animal Bite     HPI  Koko Serrano is a 64 year old male who presents for rabies prophylaxis.  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  He cleaned this out at home with hydrogen peroxide.  Bleeding well-controlled.  He then presented to [facility] who recommended that he return here for rabies prophylaxis.  He has not had rabies series before.  Denies any changes to distal sensation or motor function.      Allergies:  Allergies   Allergen Reactions    Nka [No Known Allergies]        Problem List:    Patient Active Problem List    Diagnosis Date Noted    BPH with obstruction/lower urinary tract symptoms 03/20/2014     Priority: Medium    HYPERLIPIDEMIA LDL GOAL <130 10/31/2010     Priority: Medium    Hematuria 08/21/2007     Priority: Medium     Problem list name updated by automated process. Provider to review and confirm  [employer] Update utility          Past Medical History:    Past Medical History:   Diagnosis Date    Injury, other and unspecified, elbow, forearm, and wrist     Unspecified disorder of lipoid metabolism        Past Surgical History:    Past Surgical History:   Procedure Laterality Date    COLONOSCOPY  2003    COLONOSCOPY  5/1/2014    Procedure: COLONOSCOPY;  Surgeon: Wing Hyatt MD;  Location: [facility] REMOVE TONSILS/ADENOIDS,10+ Y/O      T & A 10+y.o.    SURGICAL HISTORY OF -       Hernia Repair x2    SURGICAL HISTORY OF -       Thyroglassal Cyst Surgery    SURGICAL HISTORY OF -       Cystourethroscopy, right ureteral reimplantation on 7-    [facility] APPENDECTOMY         Family History:    Family History   Problem Relation Age of Onset    Hypertension Mother     Hyperlipidemia Mother     Circulatory Maternal Grandmother         [id number]    Alcohol/Drug Maternal Grandfather     Circulatory Paternal Grandmother         Blood clot    Hypertension Brother     Alcohol/Drug Brother      "Hyperlipidemia Brother     Hypertension Father     Hyperlipidemia Father        Social History:  Marital Status:   [2]  Social History     Tobacco Use    Smoking status: Never    Smokeless tobacco: Never   Vaping Use    Vaping status: Never Used   Substance Use Topics    Alcohol use: Yes     Comment: rare        Medications:    amoxicillin-clavulanate (AUGMENTIN) 875-125 MG tablet  Ascorbic Acid (VITAMIN C) 500 MG CHEW  Cholecalciferol (VITAMIN D) 2000 UNITS CAPS  cyanocobalamin 1000 MCG SUBL sublingual tablet  FISH OIL 1000 MG PO CAPS  GLUCOSAMINE CHONDRO COMPLEX OR  omeprazole (PRILOSEC) 20 MG DR capsule  pravastatin (PRAVACHOL) 40 MG tablet  predniSONE (DELTASONE) 20 MG tablet  rosuvastatin (CRESTOR) 20 MG tablet  tadalafil (CIALIS) 5 MG tablet  Turmeric 400 MG CAPS  Zinc Sulfate (ZINC 15 PO)          Review of Systems  Pertinent review of systems as documented per HPI above.    Physical Exam   BP: 106/70  Pulse: 59  Temp: 98.7  F (37.1  C)  Resp: 18  Height: 175.3 cm (5' 9\")  Weight: 78.5 kg (173 lb)  SpO2: 98 %      Physical Exam  Vitals and nursing note reviewed.   Constitutional:       General: He is not in acute distress.     Appearance: Normal appearance. He is not ill-appearing, toxic-appearing or diaphoretic.   HENT:      Head: Atraumatic.   Cardiovascular:      Rate and Rhythm: Normal rate.   Pulmonary:      Effort: Pulmonary effort is normal. No respiratory distress.   Musculoskeletal:      Left wrist: Normal.      Left hand: No swelling, deformity, tenderness or bony tenderness. Normal range of motion. Normal strength. Normal sensation. Normal capillary refill. Normal pulse.   Skin:     General: Skin is warm and dry.      Capillary Refill: Capillary refill takes less than 2 seconds.      Comments: Approximately 0.5 cm superficial wound to palmar side of left index finger.   Neurological:      General: No focal deficit present.      Mental Status: He is alert and oriented to person, place, and " time.   Psychiatric:         Mood and Affect: Mood normal.         Behavior: Behavior normal.         ED Course       Medications   rabies immune globulin 300 units/mL (HYPERAB) injection 1,500 Units (1,500 Units Intramuscular $Given 7/11/24 1310)   rabies vaccine, PCEC (chick embryo derived) (RABAVERT) injection 1 mL (1 mL Intramuscular $Given 7/11/24 1323)   Tdap (tetanus-diphtheria-acell pertussis) (ADACEL) injection 0.5 mL (0.5 mLs Intramuscular $Given 7/11/24 1316)       Assessments & Plan (with Medical Decision Making)     I have reviewed the nursing notes.    I have reviewed the findings, diagnosis, plan and need for follow up with the patient.  64-year-old male who presents for evaluation of an animal bite that occurred prior to arrival.  Was bit by a gopher to his left index finger.  Would like to discuss rabies prophylaxis.  See HPI above.    On exam, there is an approximately 0.5 cm superficial wound to palmar side of left index finger where the bite occurred.  This is slightly tender but he has no changes in motor function or sensation of the finger.  No other injuries.  Wound was cleansed here with Shur-Clens and sterile saline.  1 mL of rabies immunoglobulin was injected directly at the site, the remaining amount along with rabies vaccination and tetanus vaccination injected peripherally.  Antibiotic prophylaxis initiated here with Augmentin twice daily for 10 days.  Discussed with patient that he must return on day 3, 7 and 14 to receive the remaining rabies series, he can schedule day 7 and 14 at [facility] and return to UC/ED for day 3 as it is on the weekend.  Advised patient to watch for signs of infection at the bite wound including increased pain, swelling, redness or puslike drainage and advised to return sooner if he notices these.  All questions answered.  Patient verbalizes understanding and agreement with the above plan.    Disclaimer: This note consists of symbols derived from keyboarding,  dictation, and/or voice recognition software. As a result, there may be errors in the script that have gone undetected.  Please consider this when interpreting information found in the chart.      Discharge Medication List as of 7/11/2024 12:53 PM        START taking these medications    Details   amoxicillin-clavulanate (AUGMENTIN) 875-125 MG tablet Take 1 tablet by mouth 2 times daily for 10 days, Disp-20 tablet, R-0, E-Prescribe             Final diagnoses:   Need for post exposure prophylaxis for rabies   Animal bite of index finger, initial encounter       7/11/2024   [facility] EMERGENCY DEPT       Margie Godoy PA-C  07/12/24 [id number]

## 2024-07-11 NOTE — PROGRESS NOTES
URGENT CARE  Assessment & Plan   Assessment:   Koko Serrano is a 64 year old male who's clinical presentation today is consistent with:   1. Wild animal bite, terrestrial mammal, gopher bite   Plan:  Sending patient to the ED for rabies series initiation. Patient was bitten by a Wild terrestrial mammal (gopher). Per CDC guidelines, in the [place], rabies has been reported in large rodents in areas of the [place] where raccoon rabies is present. If an exposure does occur, these animal should be regarded as rabid until proven otherwise. Post-exposure prophylaxis should be initiated immediately in response to any bite. We do not start the series here in the      CHRISTIAN [facility] [place]      ______________________________________________________________________      Subjective     HPI: Koko Serrano  is a 64 year old  male who presents today for evaluation the following concerns:   Patient presents today with a gopher bite to the left index finger  Patient reached into a hole to retrieve a trap and was bitten by a gopher on his finger   Last tetanus was in 2019    Review of Systems:  Pertinent review of systems as reflected in HPI, otherwise negative.     Objective    Physical Exam:  Vitals:    07/11/24 1047   Pulse: 62   Resp: 16   SpO2: 99%   Weight: 79.4 kg (175 lb)      General:   alert and oriented, no acute distress, non ill-appearing   Vital signs reviewed: afebrile and normotensive  SKIN:   Left index finger :    Laceration  lesion noted with erythremia present,   Mild inflammation, but no ecchymosis, puss, colored discharge, or red streaks present.  Increased tenderness/pain with palpation surrounding wound.   ______________________________________________________________________    I explained my diagnostic considerations and recommendations to the patient, who voiced understanding and agreement with the treatment plan.   All questions were answered.    We discussed potential side effects, risks and benefits of any prescribed or recommended therapies, as well as expectations for response to treatments.  Please see AVS for any patient instructions & handouts given.   Patient was advised to contact the Nurse Care Line, their Primary Care provider, Urgent Care, or the Emergency Department if there are new or worsening symptoms, or call 911 for emergencies.

## 2024-07-14 ENCOUNTER — HOSPITAL ENCOUNTER (EMERGENCY)
Facility: CLINIC | Age: 64
Discharge: HOME | End: 2024-07-14
Attending: FAMILY MEDICINE | Admitting: FAMILY MEDICINE
Payer: COMMERCIAL

## 2024-07-14 VITALS
HEART RATE: 57 BPM | RESPIRATION RATE: 16 BRPM | OXYGEN SATURATION: 97 % | SYSTOLIC BLOOD PRESSURE: 115 MMHG | TEMPERATURE: 97.6 F | DIASTOLIC BLOOD PRESSURE: 73 MMHG

## 2024-07-14 PROCEDURE — G0463 HOSPITAL OUTPT CLINIC VISIT: HCPCS | Mod: 25 | Performed by: FAMILY MEDICINE

## 2024-07-14 PROCEDURE — 90471 IMMUNIZATION ADMIN: CPT | Performed by: PHYSICIAN ASSISTANT

## 2024-07-14 PROCEDURE — 250N000011 HC RX IP 250 OP 636: Performed by: PHYSICIAN ASSISTANT

## 2024-07-14 PROCEDURE — 90675 RABIES VACCINE IM: CPT | Performed by: PHYSICIAN ASSISTANT

## 2024-07-14 RX ADMIN — RABIES VACCINE 1 ML: KIT at 11:28

## 2024-07-14 ASSESSMENT — ACTIVITIES OF DAILY LIVING (ADL): ADLS_ACUITY_SCORE: 35

## 2024-07-18 ENCOUNTER — IMMUNIZATION (OUTPATIENT)
Dept: PEDIATRICS | Facility: CLINIC | Age: 64
End: 2024-07-18
Payer: COMMERCIAL

## 2024-07-18 PROCEDURE — 90471 IMMUNIZATION ADMIN: CPT

## 2024-07-18 PROCEDURE — 90675 RABIES VACCINE IM: CPT

## 2024-07-18 NOTE — PROGRESS NOTES
Prior to immunization administration, verified patients identity using patient s name and date of birth. Please see Immunization Activity for additional information.     Screening Questionnaire for Adult Immunization    Are you sick today?   No   Do you have allergies to medications, food, a vaccine component or latex?   No   Have you ever had a serious reaction after receiving a vaccination?   No   Do you have a long-term health problem with heart, lung, kidney, or metabolic disease (e.g., diabetes), asthma, a blood disorder, no spleen, complement component deficiency, a cochlear implant, or a spinal fluid leak?  Are you on long-term aspirin therapy?   No   Do you have cancer, leukemia, HIV/AIDS, or any other immune system problem?   No   Do you have a parent, brother, or sister with an immune system problem?   No   In the past 3 months, have you taken medications that affect  your immune system, such as prednisone, other steroids, or anticancer drugs; drugs for the treatment of rheumatoid arthritis, Crohn s disease, or psoriasis; or have you had radiation treatments?   No   Have you had a seizure, or a brain or other nervous system problem?   No   During the past year, have you received a transfusion of blood or blood    products, or been given immune (gamma) globulin or antiviral drug?   No   For women: Are you pregnant or is there a chance you could become       pregnant during the next month?   No   Have you received any vaccinations in the past 4 weeks?   No     Immunization questionnaire answers were all negative.      Patient instructed to remain in clinic for 15 minutes afterwards, and to report any adverse reactions.     Screening performed by Liliana Lopez MA on 7/18/2024 at 4:09 PM.

## 2024-07-19 ENCOUNTER — LAB (OUTPATIENT)
Dept: LAB | Facility: CLINIC | Age: 64
End: 2024-07-19
Payer: COMMERCIAL

## 2024-07-19 DIAGNOSIS — E78.5 HYPERLIPIDEMIA LDL GOAL <130: ICD-10-CM

## 2024-07-19 LAB
ALBUMIN SERPL BCG-MCNC: 4.3 G/DL (ref 3.5–5.2)
ALP SERPL-CCNC: 66 U/L (ref 40–150)
ALT SERPL W P-5'-P-CCNC: 23 U/L (ref 0–70)
ANION GAP SERPL CALCULATED.3IONS-SCNC: 9 MMOL/L (ref 7–15)
AST SERPL W P-5'-P-CCNC: 27 U/L (ref 0–45)
BILIRUB SERPL-MCNC: 0.3 MG/DL
BUN SERPL-MCNC: 17.1 MG/DL (ref 8–23)
CALCIUM SERPL-MCNC: 9.1 MG/DL (ref 8.8–10.4)
CHLORIDE SERPL-SCNC: 105 MMOL/L (ref 98–107)
CHOLEST SERPL-MCNC: 137 MG/DL
CREAT SERPL-MCNC: 1.06 MG/DL (ref 0.67–1.17)
EGFRCR SERPLBLD CKD-EPI 2021: 77 ML/MIN/1.73M2
FASTING STATUS PATIENT QL REPORTED: YES
FASTING STATUS PATIENT QL REPORTED: YES
GLUCOSE SERPL-MCNC: 93 MG/DL (ref 70–99)
HCO3 SERPL-SCNC: 28 MMOL/L (ref 22–29)
HDLC SERPL-MCNC: 44 MG/DL
LDLC SERPL CALC-MCNC: 76 MG/DL
NONHDLC SERPL-MCNC: 93 MG/DL
POTASSIUM SERPL-SCNC: 4.1 MMOL/L (ref 3.4–5.3)
PROT SERPL-MCNC: 6.9 G/DL (ref 6.4–8.3)
SODIUM SERPL-SCNC: 142 MMOL/L (ref 135–145)
TRIGL SERPL-MCNC: 85 MG/DL

## 2024-07-19 PROCEDURE — 36415 COLL VENOUS BLD VENIPUNCTURE: CPT

## 2024-07-19 PROCEDURE — 80061 LIPID PANEL: CPT

## 2024-07-19 PROCEDURE — 80053 COMPREHEN METABOLIC PANEL: CPT

## 2024-07-20 SDOH — HEALTH STABILITY: PHYSICAL HEALTH: ON AVERAGE, HOW MANY DAYS PER WEEK DO YOU ENGAGE IN MODERATE TO STRENUOUS EXERCISE (LIKE A BRISK WALK)?: 0 DAYS

## 2024-07-20 SDOH — HEALTH STABILITY: PHYSICAL HEALTH: ON AVERAGE, HOW MANY MINUTES DO YOU ENGAGE IN EXERCISE AT THIS LEVEL?: 0 MIN

## 2024-07-20 ASSESSMENT — SOCIAL DETERMINANTS OF HEALTH (SDOH): HOW OFTEN DO YOU GET TOGETHER WITH FRIENDS OR RELATIVES?: TWICE A WEEK

## 2024-07-23 ENCOUNTER — PATIENT OUTREACH (OUTPATIENT)
Dept: GERIATRIC MEDICINE | Facility: CLINIC | Age: 64
End: 2024-07-23
Payer: COMMERCIAL

## 2024-07-23 DIAGNOSIS — G10 HUNTINGTON'S DISEASE (H): Primary | ICD-10-CM

## 2024-07-23 ASSESSMENT — ACTIVITIES OF DAILY LIVING (ADL): DEPENDENT_IADLS:: INDEPENDENT

## 2024-07-23 NOTE — PROGRESS NOTES
[facility] Care Coordination Contact    [facility] Home Visit Assessment     Home visit for Health Risk Assessment with Koko Serrano completed on July 23, 2024    Type of residence:: Private home - no stairs  Current living arrangement:: I live in a private home with family     Assessment completed with:: Patient, Spouse or significant other    Current Care Plan  Member currently receiving the following home care services:   none  Member currently receiving the following community resources: None      Medication Review  Medication reconciliation completed in Epic: Yes  Medication set-up & administration: Independent-does not set up.  Self-administers medications.  Medication Risk Assessment Medication (1 or more, place referral to MTM): N/A: No risk factors identified  MTM Referral Placed: No: No risk factors idenified    Mental/Behavioral Health   Depression Screening:           Mental health DX:: No        Falls Assessment:   Fallen 2 or more times in the past year?: No   Any fall with injury in the past year?: No    ADL/IADL Dependencies:   Dependent ADLs:: Independent  Dependent IADLs:: Independent    [facility]: Shared information regarding [employer]. Reviewed preventative health screening and health plan supplemental benefits/incentives. Reviewed medication disposal form.    PCA Assessment completed at visit: Not Applicable     Elderly Waiver Eligibility: No-does not meet criteria    Care Plan & Recommendations: Koko is independent and healthy. No resource needs at this time.     See LTCC for detailed assessment information.    Follow-Up Plan: Member informed of future contact, plan to f/u with member with a 6 month telephone assessment.  Contact information shared with member and family, encouraged member to call with any questions or concerns at any time.    [place] care continuum providers: Please see Snapshot and Care Management  Flowsheets for Specific details of care plan.    This CC note routed to PCP, Farooq Cárdenas.    Julia Cook RN  [facility]  [phone]

## 2024-07-24 ENCOUNTER — PATIENT OUTREACH (OUTPATIENT)
Dept: GERIATRIC MEDICINE | Facility: CLINIC | Age: 64
End: 2024-07-24
Payer: COMMERCIAL

## 2024-07-24 PROBLEM — G10 HUNTINGTON'S DISEASE (H): Status: ACTIVE | Noted: 2024-07-24

## 2024-07-24 NOTE — LETTER
July 24, 2024       Koko Serrano  [address] [place] [id number]      Dear Koko,    At [facility], we re dedicated to improving your health and wellness. Enclosed is the Support Plan developed with you on 7/23/2024. Please review the Support Plan carefully.    As a reminder, during your visit we talked about:   Ways to manage your physical and mental health   Using health care to maintain and improve your health    Your preventive care needs      Remember to contact your care coordinator if you:   Are hospitalized or plan to be hospitalized    Have a fall     Have a change in your physical or mental health   Need help finding support or services    If you have questions or don t agree with your Support Plan, call me at [phone]. You can also call me if your needs change. TTY users call the [place] Relay at 977 or 1-242.381.3886 (lsmstk-md-pmahum relay service).    Sincerely,       Julia Cook RN    E-mail: [email or web address]  Phone: [phone]      [employer]                [id number] accepted     (06/2024)                [address] [place] [address]  [phone]  fax [phone]  [facility]

## 2024-07-24 NOTE — PROGRESS NOTES
[facility] Care Coordination Contact    Received after visit chart from care coordinator.  Completed following tasks: Mailed copy of care plan/support plan to member, Mailed [place] Choices signature sheet pages 3-4, and Mailed Safe Medication Disposal     Tfifanie Ahumada  Care Management Specialist  [facility]  [phone]

## 2024-07-25 ENCOUNTER — OFFICE VISIT (OUTPATIENT)
Dept: FAMILY MEDICINE | Facility: CLINIC | Age: 64
End: 2024-07-25
Payer: COMMERCIAL

## 2024-07-25 ENCOUNTER — IMMUNIZATION (OUTPATIENT)
Dept: PEDIATRICS | Facility: CLINIC | Age: 64
End: 2024-07-25
Payer: COMMERCIAL

## 2024-07-25 VITALS
WEIGHT: 176.4 LBS | HEART RATE: 67 BPM | SYSTOLIC BLOOD PRESSURE: 116 MMHG | BODY MASS INDEX: 26.13 KG/M2 | HEIGHT: 69 IN | OXYGEN SATURATION: 97 % | TEMPERATURE: 97.3 F | DIASTOLIC BLOOD PRESSURE: 70 MMHG | RESPIRATION RATE: 18 BRPM

## 2024-07-25 VITALS — TEMPERATURE: 98 F

## 2024-07-25 DIAGNOSIS — E78.5 HYPERLIPIDEMIA LDL GOAL <130: Primary | ICD-10-CM

## 2024-07-25 DIAGNOSIS — Z00.00 ENCOUNTER FOR MEDICARE ANNUAL WELLNESS EXAM: ICD-10-CM

## 2024-07-25 DIAGNOSIS — M79.672 LEFT FOOT PAIN: ICD-10-CM

## 2024-07-25 DIAGNOSIS — G10 HUNTINGTON'S DISEASE (H): ICD-10-CM

## 2024-07-25 DIAGNOSIS — T14.8XXA ANIMAL BITE: Primary | ICD-10-CM

## 2024-07-25 DIAGNOSIS — N52.9 ERECTILE DYSFUNCTION, UNSPECIFIED ERECTILE DYSFUNCTION TYPE: ICD-10-CM

## 2024-07-25 PROCEDURE — 90471 IMMUNIZATION ADMIN: CPT

## 2024-07-25 PROCEDURE — 99214 OFFICE O/P EST MOD 30 MIN: CPT | Mod: 25 | Performed by: PHYSICIAN ASSISTANT

## 2024-07-25 PROCEDURE — G0438 PPPS, INITIAL VISIT: HCPCS | Performed by: PHYSICIAN ASSISTANT

## 2024-07-25 PROCEDURE — 90675 RABIES VACCINE IM: CPT

## 2024-07-25 RX ORDER — SILDENAFIL 50 MG/1
50-100 TABLET, FILM COATED ORAL DAILY PRN
Qty: 30 TABLET | Refills: 3 | Status: SHIPPED | OUTPATIENT
Start: 2024-07-25

## 2024-07-25 RX ORDER — ROSUVASTATIN CALCIUM 20 MG/1
20 TABLET, COATED ORAL DAILY
Qty: 90 TABLET | Refills: 3 | Status: SHIPPED | OUTPATIENT
Start: 2024-07-25

## 2024-07-25 RX ORDER — TADALAFIL 20 MG/1
20 TABLET ORAL DAILY PRN
COMMUNITY
Start: 2023-12-19 | End: 2024-07-25 | Stop reason: SINTOL

## 2024-07-25 ASSESSMENT — PAIN SCALES - GENERAL: PAINLEVEL: SEVERE PAIN (7)

## 2024-07-25 NOTE — PATIENT INSTRUCTIONS
Please call [phone] to schedule colonoscopy.     Lab work looks good.    Try a Metatarsal foot pad for your left foot pain.     Lets try Sildenafil instead of Tadalafil. Normally we use a coupon program called [employer] to prescribed ED meds because you get a lot more for a lot less money. What you do is download the LocalCustomer cecil on your phone and sign up for an account. Im sure the pharmacist can help you with this if needed.     Preventive Care Advice   This is general advice given by our system to help you stay healthy. However, your care team may have specific advice just for you. Please talk to your care team about your preventive care needs.  Nutrition  Eat 5 or more servings of fruits and vegetables each day.  Try wheat bread, brown rice and whole grain pasta (instead of white bread, rice, and pasta).  Get enough calcium and vitamin D. Check the label on foods and aim for 100% of the RDA (recommended daily allowance).  Lifestyle  Exercise at least 150 minutes each week  (30 minutes a day, 5 days a week).  Do muscle strengthening activities 2 days a week. These help control your weight and prevent disease.  No smoking.  Wear sunscreen to prevent skin cancer.  Have a dental exam and cleaning every 6 months.  Yearly exams  See your health care team every year to talk about:  Any changes in your health.  Any medicines your care team has prescribed.  Preventive care, family planning, and ways to prevent chronic diseases.  Shots (vaccines)   HPV shots (up to age 26), if you've never had them before.  Hepatitis B shots (up to age 59), if you've never had them before.  COVID-19 shot: Get this shot when it's due.  Flu shot: Get a flu shot every year.  Tetanus shot: Get a tetanus shot every 10 years.  Pneumococcal, hepatitis A, and RSV shots: Ask your care team if you need these based on your risk.  Shingles shot (for age 50 and up)  General health tests  Diabetes screening:  Starting at age 35, Get screened for  diabetes at least every 3 years.  If you are younger than age 35, ask your care team if you should be screened for diabetes.  Cholesterol test: At age 39, start having a cholesterol test every 5 years, or more often if advised.  Bone density scan (DEXA): At age 50, ask your care team if you should have this scan for osteoporosis (brittle bones).  Hepatitis C: Get tested at least once in your life.  STIs (sexually transmitted infections)  Before age 24: Ask your care team if you should be screened for STIs.  After age 24: Get screened for STIs if you're at risk. You are at risk for STIs (including HIV) if:  You are sexually active with more than one person.  You don't use condoms every time.  You or a partner was diagnosed with a sexually transmitted infection.  If you are at risk for HIV, ask about PrEP medicine to prevent HIV.  Get tested for HIV at least once in your life, whether you are at risk for HIV or not.  Cancer screening tests  Cervical cancer screening: If you have a cervix, begin getting regular cervical cancer screening tests starting at age 21.  Breast cancer scan (mammogram): If you've ever had breasts, begin having regular mammograms starting at age 40. This is a scan to check for breast cancer.  Colon cancer screening: It is important to start screening for colon cancer at age 45.  Have a colonoscopy test every 10 years (or more often if you're at risk) Or, ask your provider about stool tests like a FIT test every year or Cologuard test every 3 years.  To learn more about your testing options, visit:   .  For help making a decision, visit:   https://bit.ly/ks95285.  Prostate cancer screening test: If you have a prostate, ask your care team if a prostate cancer screening test (PSA) at age 55 is right for you.  Lung cancer screening: If you are a current or former smoker ages 50 to 80, ask your care team if ongoing lung cancer screenings are right for you.  For informational purposes only. Not to  replace the advice of your health care provider. Copyright   2023 [facility]. All rights reserved. Clinically reviewed by the [facility] Transitions Program. [employer] 542920 - REV 01/24.

## 2024-07-25 NOTE — PROGRESS NOTES
"Preventive Care Visit  [facility]  Farooq Cárdenas PA-C, Family Medicine  Jul 25, 2024      Assessment & Plan   Hyperlipidemia LDL goal <130  Pt thinks his Crestor is worsening his joint pain. Will trial a half dose and see if things improve. If not, then well go back up to 20 mg daily. Cholesterol on recent recheck was well controlled.   - rosuvastatin (CRESTOR) 20 MG tablet; Take 1 tablet (20 mg) by mouth daily    Erectile dysfunction, unspecified erectile dysfunction type  Tadafil give his significant dyspepsia. Will switch to Sildenafil and see if this causes the same side effect.   - sildenafil (VIAGRA) 50 MG tablet; Take 1-2 tablets ( mg) by mouth daily as needed (at least 30 minutes prior to intercourse)    Left foot pain  Unclear etiology but chronic and not worsening. Located direction under the ball of the foot. No obvious mass or chavez's neuroma. Will trial metatarsal pads and see if that helps. If not, would look more at his back/refer to Podiatry.     Lamar's disease (H)  Family history of this. No symptoms yet. Has established with Neurology.    Encounter for Medicare annual wellness exam  64 yr old here for Medicare Wellness exam. Last MWE done 1 year(s) ago. Discussed preventative screenings which are updated below. Counseled on immunizations and healthy lifestyle. Discussed Advanced directive/end of life planning at length. Pt will discuss with his spouse. Follow-up in 1 year for repeat physical exam.       Patient has been advised of split billing requirements and indicates understanding: Yes    BMI  Estimated body mass index is 25.86 kg/m  as calculated from the following:    Height as of this encounter: 1.759 m (5' 9.25\").    Weight as of this encounter: 80 kg (176 lb 6.4 oz).       Counseling  Appropriate preventive services were addressed with this patient via screening, questionnaire, or discussion as appropriate for fall prevention, nutrition, physical " activity, Tobacco-use cessation, weight loss and cognition.  Checklist reviewing preventive services available has been given to the patient.  Reviewed patient's diet, addressing concerns and/or questions.     Margot Sutherland is a 64 year old, presenting for the following:  Physical        7/25/2024     7:49 AM   Additional Questions   Roomed by Mirna MARVIN CMA   Accompanied by Self         Health Care Directive  Patient does not have a Health Care Directive or Living Will: Discussed advance care planning with patient; information given to patient to review.    HPI    Concern - Foot numbness   Onset: ongoing   Description: Patient states that this has been a issue for years   Intensity: severe  Progression of Symptoms:  Improved since correction but still has issues   Accompanying Signs & Symptoms: none  Previous history of similar problem: yes   Precipitating factors:        Worsened by: being on his feet for extended period of time   Alleviating factors:        Improved by: rest   Therapies tried and outcome:  none         7/20/2024   General Health   How would you rate your overall physical health? Good   Feel stress (tense, anxious, or unable to sleep) To some extent      (!) STRESS CONCERN      7/20/2024   Nutrition   Diet: Regular (no restrictions)            7/20/2024   Exercise   Days per week of moderate/strenous exercise 0 days   Average minutes spent exercising at this level 0 min      (!) EXERCISE CONCERN      7/20/2024   Social Factors   Frequency of gathering with friends or relatives Twice a week   Worry food won't last until get money to buy more Yes   Food not last or not have enough money for food? No   Do you have housing? (Housing is defined as stable permanent housing and does not include staying ouside in a car, in a tent, in an abandoned building, in an overnight shelter, or couch-surfing.) Yes   Are you worried about losing your housing? No   Lack of transportation? No   Unable to get  utilities (heat,electricity)? No      (!) FOOD SECURITY CONCERN PRESENT      7/23/2024   Fall Risk   Fallen 2 or more times in the past year? No             7/20/2024   Activities of Daily Living- Home Safety   Needs help with the following daily activites None of the above   Safety concerns in the home None of the above            7/20/2024   Dental   Dentist two times every year? Yes            7/20/2024   Hearing Screening   Hearing concerns? None of the above            7/20/2024   Driving Risk Screening   Patient/family members have concerns about driving No            7/20/2024   General Alertness/Fatigue Screening   Have you been more tired than usual lately? No            7/20/2024   Urinary Incontinence Screening   Bothered by leaking urine in past 6 months No          7/20/2024   TB Screening   Were you born outside of the [place]? No      Today's PHQ-2 Score:       7/24/2024    10:01 AM   PHQ-2 ( 1999 [employer])   Q1: Little interest or pleasure in doing things 0   Q2: Feeling down, depressed or hopeless 0   PHQ-2 Score 0   Q1: Little interest or pleasure in doing things Not at all   Q2: Feeling down, depressed or hopeless Not at all   PHQ-2 Score 0           7/20/2024   Substance Use   Alcohol more than 3/day or more than 7/wk No   Do you have a current opioid prescription? No   How severe/bad is pain from 1 to 10? 8/10   Do you use any other substances recreationally? No        Social History     Tobacco Use    Smoking status: Never    Smokeless tobacco: Never   Vaping Use    Vaping status: Never Used   Substance Use Topics    Alcohol use: Yes     Comment: rare           7/20/2024   AAA Screening   Family history of Abdominal Aortic Aneurysm (AAA)? (!) YES       Last PSA:   PSA   Date Value Ref Range Status   09/24/2015 1.07 0 - 4 ug/L Final     Prostate Specific Antigen Screen   Date Value Ref Range Status   10/25/2023 3.21 0.00 - 4.50 ng/mL Final     ASCVD Risk   The 10-year ASCVD risk score (Essence  "FELICITAS, et al., 2019) is: 9.8%    Values used to calculate the score:      Age: 64 years      Sex: Male      Is Non- : No      Diabetic: No      Tobacco smoker: No      Systolic Blood Pressure: 116 mmHg      Is BP treated: No      HDL Cholesterol: 44 mg/dL      Total Cholesterol: 137 mg/dL        Reviewed and updated as needed this visit by Provider   Tobacco  Allergies  Meds  Problems  Med Hx  Surg Hx  Fam Hx            Current providers sharing in care for this patient include:  Patient Care Team:  Farooq Cárdenas PA-C as PCP - General (Family Medicine)  Farooq Cárdenas PA-C as Assigned PCP  Cathi Moctezuma MD as Assigned Musculoskeletal Provider  Julia Cook RN as Lead Care Coordinator (Primary Care - CC)    The following health maintenance items are reviewed in Epic and correct as of today:  Health Maintenance   Topic Date Due    Pneumococcal Vaccine: 65+ Years (1 of 2 - PCV) Never done    ZOSTER IMMUNIZATION (1 of 2) Never done    RSV VACCINE (Pregnancy & 60+) (1 - 1-dose 60+ series) Never done    COVID-19 Vaccine (3 - Moderna risk series) 04/21/2021    COLORECTAL CANCER SCREENING  05/01/2024    INFLUENZA VACCINE (1) 09/01/2024    LIPID  07/19/2025    MEDICARE ANNUAL WELLNESS VISIT  07/25/2025    ANNUAL REVIEW OF HM ORDERS  07/25/2025    FALL RISK ASSESSMENT  07/25/2025    GLUCOSE  07/19/2027    ADVANCE CARE PLANNING  07/25/2028    DTAP/TDAP/TD IMMUNIZATION (4 - Td or Tdap) 07/11/2034    HEPATITIS C SCREENING  Completed    PHQ-2 (once per calendar year)  Completed    IPV IMMUNIZATION  Aged Out    HPV IMMUNIZATION  Aged Out    MENINGITIS IMMUNIZATION  Aged Out    RSV MONOCLONAL ANTIBODY  Aged Out     Review of Systems  See HPI      Objective    Exam  /70 (BP Location: Right arm, Patient Position: Sitting, Cuff Size: Adult Regular)   Pulse 67   Temp 97.3  F (36.3  C) (Tympanic)   Resp 18   Ht 1.759 m (5' 9.25\")   Wt 80 kg (176 lb 6.4 oz)   SpO2 97%   BMI 25.86 " "kg/m     Estimated body mass index is 25.86 kg/m  as calculated from the following:    Height as of this encounter: 1.759 m (5' 9.25\").    Weight as of this encounter: 80 kg (176 lb 6.4 oz).    Physical Exam  Constitutional: healthy, alert, and no distress  Head: Normocephalic. Atraumatic  Eyes: No conjunctival injection, sclera anicteric  Neck: supple, no thyromegaly, nodules or asymmetry of the thyroid. No cervical LAD.  Cardiovascular: RRR. No murmurs, clicks, gallops, or rubs. No peripheral edema.   Respiratory: No resp distress. Lungs CTAB bilaterally.   Musculoskeletal: extremities normal- no gross deformities noted, and normal muscle tone. Left foot plantar aspect without tenderness, mass, erythema or swelling.   Skin: no suspicious lesions or rashes  Neurologic: Gait normal. CN 2-12 grossly intact  Psychiatric: mentation appears normal and affect normal/bright            7/25/2024   Mini Cog   Clock Draw Score 2 Normal   3 Item Recall 2 objects recalled   Mini Cog Total Score 4                 Signed Electronically by: Farooq Cárdenas PA-C    "

## 2024-07-25 NOTE — PROGRESS NOTES
Prior to immunization administration, verified patients identity using patient s name and date of birth. Please see Immunization Activity for additional information.     Screening Questionnaire for Adult Immunization    Are you sick today?   No   Do you have allergies to medications, food, a vaccine component or latex?   No   Have you ever had a serious reaction after receiving a vaccination?   No   Do you have a long-term health problem with heart, lung, kidney, or metabolic disease (e.g., diabetes), asthma, a blood disorder, no spleen, complement component deficiency, a cochlear implant, or a spinal fluid leak?  Are you on long-term aspirin therapy?   No   Do you have cancer, leukemia, HIV/AIDS, or any other immune system problem?   No   Do you have a parent, brother, or sister with an immune system problem?   No   In the past 3 months, have you taken medications that affect  your immune system, such as prednisone, other steroids, or anticancer drugs; drugs for the treatment of rheumatoid arthritis, Crohn s disease, or psoriasis; or have you had radiation treatments?   No   Have you had a seizure, or a brain or other nervous system problem?   No   During the past year, have you received a transfusion of blood or blood    products, or been given immune (gamma) globulin or antiviral drug?   No   For women: Are you pregnant or is there a chance you could become       pregnant during the next month?   No   Have you received any vaccinations in the past 4 weeks?   No     Immunization questionnaire answers were all negative.      Patient instructed to remain in clinic for 15 minutes afterwards, and to report any adverse reactions.     Screening performed by Liliana Lopez MA on 7/25/2024 at 4:00 PM.

## 2024-08-05 ENCOUNTER — TELEPHONE (OUTPATIENT)
Dept: GASTROENTEROLOGY | Facility: CLINIC | Age: 64
End: 2024-08-05
Payer: COMMERCIAL

## 2024-08-05 NOTE — TELEPHONE ENCOUNTER
"Pt would like to schedule at [place] location. Writer transferred pt.    Endoscopy Scheduling Screen    Have you had a positive Covid test in the last 14 days?  No    What is your communication preference for Instructions and/or Bowel Prep?   MyChart    What insurance is in the chart?  Other:  [facility]    Ordering/Referring Provider: Farooq Cárdenas PA-C   (If ordering provider performs procedure, schedule with ordering provider unless otherwise instructed. )    BMI: Estimated body mass index is 25.86 kg/m  as calculated from the following:    Height as of 7/25/24: 1.759 m (5' 9.25\").    Weight as of 7/25/24: 80 kg (176 lb 6.4 oz).     Sedation Ordered  moderate sedation.   If patient BMI > 50 do not schedule in [facility].    If patient BMI > 45 do not schedule at [facility].    Are you taking methadone or Suboxone?  No    Have you had difficulties, pain, or discomfort during past endoscopy procedures?  No    Are you taking any prescription medications for pain 3 or more times per week?   NO, No RN review required.    Do you have a history of malignant hyperthermia?  No    (Females) Are you currently pregnant?   No     Have you been diagnosed or told you have pulmonary hypertension?   No    Do you have an LVAD?  No    Have you been told you have moderate to severe sleep apnea?  No    Have you been told you have COPD, asthma, or any other lung disease?  No    Do you have any heart conditions?  No     Have you ever had or are you waiting for an organ transplant?  No. Continue scheduling, no site restrictions.    Have you had a stroke or transient ischemic attack (TIA aka \"mini stroke\" in the last 6 months?   No    Have you been diagnosed with or been told you have cirrhosis of the liver?   No    Are you currently on dialysis?   No    Do you need assistance transferring?   No    BMI: Estimated body mass index is 25.86 kg/m  as calculated from the following:    Height as of 7/25/24: 1.759 m (5' 9.25\").    Weight as of 7/25/24: 80 kg " (176 lb 6.4 oz).     Is patients BMI > 40 and scheduling location UP?  No    Do you take an injectable medication for weight loss or diabetes (excluding insulin)?  No    Do you take the medication Naltrexone?  No    Do you take blood thinners?  No       Prep   Are you currently on dialysis or do you have chronic kidney disease?  No    Do you have a diagnosis of diabetes?  No    Do you have a diagnosis of cystic fibrosis (CF)?  No    On a regular basis do you go 3 -5 days between bowel movements?  No    BMI > 40?  No    Preferred Pharmacy:    [facility] [address] [place] [address] [address]   [address] [facility] [address]  Phone: [phone] Fax: [phone]      Final Scheduling Details     Procedure scheduled  Colonoscopy        Patient Reminders:   You will receive a call from a Nurse to review instructions and health history.  This assessment must be completed prior to your procedure.  Failure to complete the Nurse assessment may result in the procedure being cancelled.      On the day of your procedure, please designate an adult(s) who can drive you home stay with you for the next 24 hours. The medicines used in the exam will make you sleepy. You will not be able to drive.      You cannot take public transportation, ride share services, or non-medical taxi service without a responsible caregiver.  Medical transport services are allowed with the requirement that a responsible caregiver will receive you at your destination.  We require that drivers and caregivers are confirmed prior to your procedure.

## 2024-10-15 ENCOUNTER — ANESTHESIA EVENT (OUTPATIENT)
Dept: GASTROENTEROLOGY | Facility: CLINIC | Age: 64
End: 2024-10-15
Payer: COMMERCIAL

## 2024-10-15 NOTE — ANESTHESIA PREPROCEDURE EVALUATION
Anesthesia Pre-Procedure Evaluation    Patient: Koko Serrano   MRN: [id number] : 1960        Procedure : Procedure(s):  Colonoscopy          Past Medical History:   Diagnosis Date    Arthritis     Bad hands    Injury, other and unspecified, elbow, forearm, and wrist     Unspecified disorder of lipoid metabolism       Past Surgical History:   Procedure Laterality Date    COLONOSCOPY      COLONOSCOPY  2014    Procedure: COLONOSCOPY;  Surgeon: Wing Hyatt MD;  Location: [place] [facility]    HC REMOVE TONSILS/ADENOIDS,12+ Y/O      T & A 10+y.o.    SURGICAL HISTORY OF -       Hernia Repair x2    SURGICAL HISTORY OF -       Thyroglassal Cyst Surgery    SURGICAL HISTORY OF -       Cystourethroscopy, right ureteral reimplantation on 1994    ZZC APPENDECTOMY        Allergies   Allergen Reactions    Nka [No Known Allergies]       Social History     Tobacco Use    Smoking status: Never    Smokeless tobacco: Never   Substance Use Topics    Alcohol use: Yes     Comment: rare      Wt Readings from Last 1 Encounters:   24 80 kg (176 lb 6.4 oz)        Anesthesia Evaluation   Pt has had prior anesthetic. Type: MAC and General.        ROS/MED HX  ENT/Pulmonary:  - neg pulmonary ROS     Neurologic: Comment: Huntingtons disease      Cardiovascular:       METS/Exercise Tolerance:     Hematologic:  - neg hematologic  ROS     Musculoskeletal:  - neg musculoskeletal ROS     GI/Hepatic:       Renal/Genitourinary:       Endo:  - neg endo ROS     Psychiatric/Substance Use:  - neg psychiatric ROS     Infectious Disease:       Malignancy:       Other:          Physical Exam    Airway        Mallampati: I   TM distance: > 3 FB   Neck ROM: full   Mouth opening: > 3 cm    Respiratory Devices and Support         Dental       (+) Minor Abnormalities - some fillings, tiny chips      Cardiovascular   cardiovascular exam normal          Pulmonary   pulmonary exam normal                OUTSIDE LABS:  CBC:   Lab Results  "  Component Value Date    WBC 6.6 12/20/2016    WBC 4.2 09/24/2015    HGB 14.7 12/20/2016    HGB 15.0 09/24/2015    HCT 44.1 12/20/2016    HCT 45.3 09/24/2015     12/20/2016     09/24/2015     BMP:   Lab Results   Component Value Date     07/19/2024     07/10/2023    POTASSIUM 4.1 07/19/2024    POTASSIUM 4.6 07/10/2023    CHLORIDE 105 07/19/2024    CHLORIDE 106 07/10/2023    CO2 28 07/19/2024    CO2 26 07/10/2023    BUN 17.1 07/19/2024    BUN 16.8 07/10/2023    CR 1.06 07/19/2024    CR 1.04 07/10/2023    GLC 93 07/19/2024    GLC 81 07/10/2023     COAGS: No results found for: \"PTT\", \"INR\", \"FIBR\"  POC: No results found for: \"BGM\", \"HCG\", \"HCGS\"  HEPATIC:   Lab Results   Component Value Date    ALBUMIN 4.3 07/19/2024    PROTTOTAL 6.9 07/19/2024    ALT 23 07/19/2024    AST 27 07/19/2024    ALKPHOS 66 07/19/2024    BILITOTAL 0.3 07/19/2024     OTHER:   Lab Results   Component Value Date    GLENN 9.1 07/19/2024    LIPASE 135 09/21/2005       Anesthesia Plan    ASA Status:  3       Anesthesia Type: General.              Consents    Anesthesia Plan(s) and associated risks, benefits, and realistic alternatives discussed. Questions answered and patient/representative(s) expressed understanding.     - Discussed: Risks, Benefits and Alternatives for BOTH SEDATION and the PROCEDURE were discussed     - Discussed with:  Patient            Postoperative Care       PONV prophylaxis: Background Propofol Infusion     Comments:               CHRISTIAN Louie CRNA    I have reviewed the pertinent notes and labs in the chart from the past 30 days and (re)examined the patient.  Any updates or changes from those notes are reflected in this note.                 # Dementia: noted on problem list                 "

## 2024-10-16 ENCOUNTER — ANESTHESIA (OUTPATIENT)
Dept: GASTROENTEROLOGY | Facility: CLINIC | Age: 64
End: 2024-10-16
Payer: COMMERCIAL

## 2024-10-16 ENCOUNTER — HOSPITAL ENCOUNTER (OUTPATIENT)
Facility: CLINIC | Age: 64
Discharge: HOME | End: 2024-10-16
Attending: SURGERY | Admitting: SURGERY
Payer: COMMERCIAL

## 2024-10-16 VITALS
SYSTOLIC BLOOD PRESSURE: 103 MMHG | BODY MASS INDEX: 26.07 KG/M2 | WEIGHT: 176 LBS | HEART RATE: 70 BPM | RESPIRATION RATE: 16 BRPM | TEMPERATURE: 97 F | HEIGHT: 69 IN | DIASTOLIC BLOOD PRESSURE: 71 MMHG | OXYGEN SATURATION: 97 %

## 2024-10-16 LAB — COLONOSCOPY: NORMAL

## 2024-10-16 PROCEDURE — 250N000011 HC RX IP 250 OP 636: Performed by: NURSE ANESTHETIST, CERTIFIED REGISTERED

## 2024-10-16 PROCEDURE — 45378 DIAGNOSTIC COLONOSCOPY: CPT | Performed by: SURGERY

## 2024-10-16 PROCEDURE — 370N000017 HC ANESTHESIA TECHNICAL FEE, PER MIN: Performed by: SURGERY

## 2024-10-16 PROCEDURE — 250N000009 HC RX 250: Performed by: SURGERY

## 2024-10-16 PROCEDURE — G0121 COLON CA SCRN NOT HI RSK IND: HCPCS | Performed by: SURGERY

## 2024-10-16 RX ORDER — NALOXONE HYDROCHLORIDE 0.4 MG/ML
0.1 INJECTION, SOLUTION INTRAMUSCULAR; INTRAVENOUS; SUBCUTANEOUS
Status: DISCONTINUED | OUTPATIENT
Start: 2024-10-16 | End: 2024-10-16 | Stop reason: HOSPADM

## 2024-10-16 RX ORDER — SODIUM CHLORIDE, SODIUM LACTATE, POTASSIUM CHLORIDE, CALCIUM CHLORIDE 600; 310; 30; 20 MG/100ML; MG/100ML; MG/100ML; MG/100ML
INJECTION, SOLUTION INTRAVENOUS CONTINUOUS
Status: DISCONTINUED | OUTPATIENT
Start: 2024-10-16 | End: 2024-10-16 | Stop reason: HOSPADM

## 2024-10-16 RX ORDER — DEXAMETHASONE SODIUM PHOSPHATE 4 MG/ML
4 INJECTION, SOLUTION INTRA-ARTICULAR; INTRALESIONAL; INTRAMUSCULAR; INTRAVENOUS; SOFT TISSUE
Status: DISCONTINUED | OUTPATIENT
Start: 2024-10-16 | End: 2024-10-16 | Stop reason: HOSPADM

## 2024-10-16 RX ORDER — ONDANSETRON 2 MG/ML
4 INJECTION INTRAMUSCULAR; INTRAVENOUS EVERY 30 MIN PRN
Status: DISCONTINUED | OUTPATIENT
Start: 2024-10-16 | End: 2024-10-16 | Stop reason: HOSPADM

## 2024-10-16 RX ORDER — ONDANSETRON 2 MG/ML
4 INJECTION INTRAMUSCULAR; INTRAVENOUS
Status: DISCONTINUED | OUTPATIENT
Start: 2024-10-16 | End: 2024-10-16 | Stop reason: HOSPADM

## 2024-10-16 RX ORDER — LIDOCAINE 40 MG/G
CREAM TOPICAL
Status: DISCONTINUED | OUTPATIENT
Start: 2024-10-16 | End: 2024-10-16 | Stop reason: HOSPADM

## 2024-10-16 RX ORDER — HYDROMORPHONE HCL IN WATER/PF 6 MG/30 ML
0.4 PATIENT CONTROLLED ANALGESIA SYRINGE INTRAVENOUS EVERY 5 MIN PRN
Status: DISCONTINUED | OUTPATIENT
Start: 2024-10-16 | End: 2024-10-16 | Stop reason: HOSPADM

## 2024-10-16 RX ORDER — HYDROMORPHONE HCL IN WATER/PF 6 MG/30 ML
0.2 PATIENT CONTROLLED ANALGESIA SYRINGE INTRAVENOUS EVERY 5 MIN PRN
Status: DISCONTINUED | OUTPATIENT
Start: 2024-10-16 | End: 2024-10-16 | Stop reason: HOSPADM

## 2024-10-16 RX ORDER — ONDANSETRON 4 MG/1
4 TABLET, ORALLY DISINTEGRATING ORAL EVERY 30 MIN PRN
Status: DISCONTINUED | OUTPATIENT
Start: 2024-10-16 | End: 2024-10-16 | Stop reason: HOSPADM

## 2024-10-16 RX ORDER — FLUMAZENIL 0.1 MG/ML
0.2 INJECTION, SOLUTION INTRAVENOUS
Status: CANCELLED | OUTPATIENT
Start: 2024-10-16 | End: 2024-10-16

## 2024-10-16 RX ORDER — PROPOFOL 10 MG/ML
INJECTION, EMULSION INTRAVENOUS PRN
Status: DISCONTINUED | OUTPATIENT
Start: 2024-10-16 | End: 2024-10-16

## 2024-10-16 RX ORDER — METOPROLOL TARTRATE 1 MG/ML
1-2 INJECTION, SOLUTION INTRAVENOUS EVERY 5 MIN PRN
Status: DISCONTINUED | OUTPATIENT
Start: 2024-10-16 | End: 2024-10-16 | Stop reason: HOSPADM

## 2024-10-16 RX ADMIN — LIDOCAINE HYDROCHLORIDE 0.1 ML: 10 INJECTION, SOLUTION EPIDURAL; INFILTRATION; INTRACAUDAL; PERINEURAL at 08:04

## 2024-10-16 RX ADMIN — PROPOFOL 100 MG: 10 INJECTION, EMULSION INTRAVENOUS at 08:25

## 2024-10-16 RX ADMIN — PROPOFOL 50 MG: 10 INJECTION, EMULSION INTRAVENOUS at 08:28

## 2024-10-16 ASSESSMENT — ACTIVITIES OF DAILY LIVING (ADL)
ADLS_ACUITY_SCORE: 35
ADLS_ACUITY_SCORE: 35

## 2024-10-16 NOTE — H&P
[facility]    Pre-Endoscopy History and Physical     Koko Serrano MRN# [id number]   YOB: 1960 Age: 64 year old     Date of Procedure: 10/16/2024  Primary care provider: Farooq Cárdenas  Type of Endoscopy: Colonoscopy with possible biopsy, possible polypectomy  Reason for Procedure: Screening  Type of Anesthesia Anticipated: Conscious Sedation    HPI:    Koko is a 64 year old male who will be undergoing the above procedure.      Last colonoscopy 2014.  Denies blood in stool or change in stool size. No known family history of colon cancer.    A history and physical has been performed. The patient's medications and allergies have been reviewed. The risks and benefits of the procedure and the sedation options and risks were discussed with the patient.  All questions were answered and informed consent was obtained.      He denies a personal or family history of anesthesia complications or bleeding disorders.     Patient Active Problem List   Diagnosis    HYPERLIPIDEMIA LDL GOAL <130    BPH with obstruction/lower urinary tract symptoms    Halifax's disease (H)        Past Medical History:   Diagnosis Date    Arthritis 2000    Bad hands    Injury, other and unspecified, elbow, forearm, and wrist     Unspecified disorder of lipoid metabolism         Past Surgical History:   Procedure Laterality Date    COLONOSCOPY  2003    COLONOSCOPY  5/1/2014    Procedure: COLONOSCOPY;  Surgeon: Wing Hyatt MD;  Location: [place] [facility]    HC REMOVE TONSILS/ADENOIDS,12+ Y/O      T & A 10+y.o.    SURGICAL HISTORY OF -       Hernia Repair x2    SURGICAL HISTORY OF -       Thyroglassal Cyst Surgery    SURGICAL HISTORY OF -       Cystourethroscopy, right ureteral reimplantation on 5-    ZZC APPENDECTOMY         Social History     Tobacco Use    Smoking status: Never    Smokeless tobacco: Never   Substance Use Topics    Alcohol use: Yes     Comment: rare       Family History   Problem Relation  "Age of Onset    Hypertension Mother     Hyperlipidemia Mother     Circulatory Maternal Grandmother         aneurysm-AAA    Alcohol/Drug Maternal Grandfather     Circulatory Paternal Grandmother         Blood clot    Hypertension Brother     Alcohol/Drug Brother     Hyperlipidemia Brother     Hypertension Father     Hyperlipidemia Father        Prior to Admission medications    Medication Sig Start Date End Date Taking? Authorizing Provider   FISH OIL 1000 MG PO CAPS 1 CAPSULES DAILY     Reported, Patient   GLUCOSAMINE CHONDRO COMPLEX OR 1 tablet by mouth daily    Reported, Patient   rosuvastatin (CRESTOR) 20 MG tablet Take 1 tablet (20 mg) by mouth daily 7/25/24   Farooq Cárdenas PA-C   sildenafil (VIAGRA) 50 MG tablet Take 1-2 tablets ( mg) by mouth daily as needed (at least 30 minutes prior to intercourse) 7/25/24   Farooq Cárdenas PA-C   Turmeric 400 MG CAPS     Reported, Patient       Allergies   Allergen Reactions    Nka [No Known Allergies]         REVIEW OF SYSTEMS:   5 point ROS negative except as noted above in HPI, including Gen., Resp., CV, GI &  system review.    PHYSICAL EXAM:   There were no vitals taken for this visit. Estimated body mass index is 25.86 kg/m  as calculated from the following:    Height as of 7/25/24: 1.759 m (5' 9.25\").    Weight as of 7/25/24: 80 kg (176 lb 6.4 oz).   Constitutional: Awake, alert, no acute distress.  Eyes: No scleral icterus.  Conjunctiva are without injection.  ENMT: Mucous membranes moist, dentition and gums are intact.   Neck: Soft, supple, trachea midline.    Endocrine: n/a   Lymphatic: There is no cervical, submandibularadenopathy.  Respiratory: normal effortgs   Cardiovascular: S1, S2  Abdomen: Non-distended, non-tender,  No masses,  Musculoskeletal: No spinal or CVA tenderness. Full range of motion in the upper and lower extremities.    Skin: No skin rashes or lesions to inspection.  No petechia.    Neurologic: alerted and oriented " 3x  Psychiatric: The patient's affect is not blunted and mood is appropriate.  DIAGNOSTICS:    Not indicated    IMPRESSION   ASA Class 2 - Mild systemic disease    PLAN:   Plan for Colonoscopy with possible biopsy, possible polypectomy. We discussed the risks, benefits and alternatives and the patient wished to proceed.  Patient is cleared for the above procedure.    The above has been forwarded to the consulting provider.    Farooq Junior DO  [facility]

## 2024-10-16 NOTE — ANESTHESIA POSTPROCEDURE EVALUATION
Patient: Koko Serrano    Procedure: Procedure(s):  Colonoscopy       Anesthesia Type:  General    Note:  Disposition: Outpatient   Postop Pain Control: Uneventful            Sign Out: Well controlled pain   PONV: No   Neuro/Psych: Uneventful            Sign Out: Acceptable/Baseline neuro status   Airway/Respiratory: Uneventful            Sign Out: Acceptable/Baseline resp. status   CV/Hemodynamics: Uneventful            Sign Out: Acceptable CV status; No obvious hypovolemia; No obvious fluid overload   Other NRE: NONE   DID A NON-ROUTINE EVENT OCCUR? No           Last vitals:  Vitals Value Taken Time   /74 10/16/24 0845   Temp 36.1  C (97  F) 10/16/24 0843   Pulse 59 10/16/24 0845   Resp 16 10/16/24 0843   SpO2 98 % 10/16/24 0848   Vitals shown include unfiled device data.    Electronically Signed By: CHRISTIAN Heart CRNA  October 16, 2024  8:49 AM

## 2024-10-16 NOTE — ANESTHESIA CARE TRANSFER NOTE
Patient: Koko Serrano    Procedure: Procedure(s):  Colonoscopy       Diagnosis: Special screening for malignant neoplasms, colon [Z12.11]  Diagnosis Additional Information: No value filed.    Anesthesia Type:   General     Note:    Oropharynx: oropharynx clear of all foreign objects and spontaneously breathing  Level of Consciousness: awake  Oxygen Supplementation: room air    Independent Airway: airway patency satisfactory and stable  Dentition: dentition unchanged  Vital Signs Stable: post-procedure vital signs reviewed and stable  Report to RN Given: handoff report given  Patient transferred to: Phase II    Handoff Report: Identifed the Patient, Identified the Reponsible Provider, Reviewed the pertinent medical history, Discussed the surgical course, Reviewed Intra-OP anesthesia mangement and issues during anesthesia, Set expectations for post-procedure period and Allowed opportunity for questions and acknowledgement of understanding      Vitals:  Vitals Value Taken Time   /74 10/16/24 0845   Temp 36.1  C (97  F) 10/16/24 0843   Pulse 59 10/16/24 0845   Resp 16 10/16/24 0843   SpO2 98 % 10/16/24 0849   Vitals shown include unfiled device data.    Electronically Signed By: CHRISTIAN Heart CRNA  October 16, 2024  8:50 AM

## 2024-11-12 ENCOUNTER — MYC REFILL (OUTPATIENT)
Dept: FAMILY MEDICINE | Facility: CLINIC | Age: 64
End: 2024-11-12
Payer: COMMERCIAL

## 2024-11-12 DIAGNOSIS — E78.5 HYPERLIPIDEMIA LDL GOAL <130: ICD-10-CM

## 2024-11-12 RX ORDER — ROSUVASTATIN CALCIUM 20 MG/1
20 TABLET, COATED ORAL DAILY
Qty: 90 TABLET | Refills: 3 | Status: SHIPPED | OUTPATIENT
Start: 2024-11-12

## 2024-12-23 ENCOUNTER — PATIENT OUTREACH (OUTPATIENT)
Dept: GERIATRIC MEDICINE | Facility: CLINIC | Age: 64
End: 2024-12-23
Payer: COMMERCIAL

## 2025-02-26 NOTE — PROGRESS NOTES
[facility] Care Coordination Contact      [facility] Six-Month Telephone Assessment    6 month telephone assessment completed on 12/23/24.    ER visits: No  Hospitalizations: No  TCU stays: No  Significant health status changes: Koko said he changed to a Medicare supplement plan 12/1/24.   Falls/Injuries: No  ADL/IADL changes: No  Changes in services: No    Caregiver Assessment follow up:  none    Julia Cook RN  [facility]  [phone]           4 (moderate pain)

## 2025-05-27 ENCOUNTER — PATIENT OUTREACH (OUTPATIENT)
Dept: GERIATRIC MEDICINE | Facility: CLINIC | Age: 65
End: 2025-05-27
Payer: COMMERCIAL

## 2025-05-27 NOTE — PROGRESS NOTES
[facility] Care Coordination Contact    No longer active with [facility] community case management effective 2/28/25.  Reason for community disenrollment: MA Term.    Julia Cook RN  [facility]  [phone]

## 2025-07-30 ENCOUNTER — LAB (OUTPATIENT)
Dept: LAB | Facility: CLINIC | Age: 65
End: 2025-07-30
Payer: COMMERCIAL

## 2025-07-30 DIAGNOSIS — E78.5 HYPERLIPIDEMIA LDL GOAL <130: ICD-10-CM

## 2025-07-30 DIAGNOSIS — Z12.5 SCREENING FOR PROSTATE CANCER: ICD-10-CM

## 2025-07-30 LAB
ALBUMIN SERPL BCG-MCNC: 4.4 G/DL (ref 3.5–5.2)
ALP SERPL-CCNC: 69 U/L (ref 40–150)
ALT SERPL W P-5'-P-CCNC: 18 U/L (ref 0–70)
ANION GAP SERPL CALCULATED.3IONS-SCNC: 10 MMOL/L (ref 7–15)
AST SERPL W P-5'-P-CCNC: 25 U/L (ref 0–45)
BILIRUB SERPL-MCNC: 0.6 MG/DL
BUN SERPL-MCNC: 18.7 MG/DL (ref 8–23)
CALCIUM SERPL-MCNC: 9.2 MG/DL (ref 8.8–10.4)
CHLORIDE SERPL-SCNC: 103 MMOL/L (ref 98–107)
CHOLEST SERPL-MCNC: 169 MG/DL
CREAT SERPL-MCNC: 0.96 MG/DL (ref 0.67–1.17)
EGFRCR SERPLBLD CKD-EPI 2021: 87 ML/MIN/1.73M2
FASTING STATUS PATIENT QL REPORTED: YES
FASTING STATUS PATIENT QL REPORTED: YES
GLUCOSE SERPL-MCNC: 102 MG/DL (ref 70–99)
HCO3 SERPL-SCNC: 24 MMOL/L (ref 22–29)
HDLC SERPL-MCNC: 45 MG/DL
LDLC SERPL CALC-MCNC: 102 MG/DL
NONHDLC SERPL-MCNC: 124 MG/DL
POTASSIUM SERPL-SCNC: 4.1 MMOL/L (ref 3.4–5.3)
PROT SERPL-MCNC: 7.1 G/DL (ref 6.4–8.3)
PSA SERPL DL<=0.01 NG/ML-MCNC: 3.07 NG/ML (ref 0–4.5)
SODIUM SERPL-SCNC: 137 MMOL/L (ref 135–145)
TRIGL SERPL-MCNC: 111 MG/DL

## 2025-07-30 PROCEDURE — 36415 COLL VENOUS BLD VENIPUNCTURE: CPT

## 2025-07-30 PROCEDURE — 80061 LIPID PANEL: CPT

## 2025-07-30 PROCEDURE — G0103 PSA SCREENING: HCPCS

## 2025-07-30 PROCEDURE — 80053 COMPREHEN METABOLIC PANEL: CPT

## (undated) DEVICE — ENDO CATH ESOPHAGEAL BALLOON PET 18MM 000345

## (undated) DEVICE — ENDO FORCEP ENDOJAW BIOPSY 2.8MMX230CM FB-220U

## (undated) DEVICE — ENDO INFLATION DEVICE ESOPHAGEAL BALLOON

## (undated) RX ORDER — LIDOCAINE HYDROCHLORIDE 10 MG/ML
INJECTION, SOLUTION EPIDURAL; INFILTRATION; INTRACAUDAL; PERINEURAL
Status: DISPENSED
Start: 2024-10-16

## (undated) RX ORDER — PROPOFOL 10 MG/ML
INJECTION, EMULSION INTRAVENOUS
Status: DISPENSED
Start: 2024-10-16